# Patient Record
Sex: FEMALE | Race: ASIAN | NOT HISPANIC OR LATINO | Employment: FULL TIME | URBAN - METROPOLITAN AREA
[De-identification: names, ages, dates, MRNs, and addresses within clinical notes are randomized per-mention and may not be internally consistent; named-entity substitution may affect disease eponyms.]

---

## 2019-03-20 ENCOUNTER — OFFICE VISIT (OUTPATIENT)
Dept: FAMILY MEDICINE | Facility: CLINIC | Age: 39
End: 2019-03-20
Payer: COMMERCIAL

## 2019-03-20 VITALS
RESPIRATION RATE: 16 BRPM | HEART RATE: 77 BPM | BODY MASS INDEX: 18.07 KG/M2 | HEIGHT: 63 IN | OXYGEN SATURATION: 99 % | SYSTOLIC BLOOD PRESSURE: 106 MMHG | WEIGHT: 102 LBS | DIASTOLIC BLOOD PRESSURE: 73 MMHG

## 2019-03-20 DIAGNOSIS — N64.4 BREAST PAIN: ICD-10-CM

## 2019-03-20 DIAGNOSIS — R06.00 DYSPNEA, UNSPECIFIED TYPE: ICD-10-CM

## 2019-03-20 DIAGNOSIS — Z00.00 WELL ADULT EXAM: Primary | ICD-10-CM

## 2019-03-20 DIAGNOSIS — R00.2 PALPITATIONS: ICD-10-CM

## 2019-03-20 DIAGNOSIS — Z30.011 ENCOUNTER FOR INITIAL PRESCRIPTION OF CONTRACEPTIVE PILLS: ICD-10-CM

## 2019-03-20 PROCEDURE — G0145 SCR C/V CYTO,THINLAYER,RESCR: HCPCS | Performed by: FAMILY MEDICINE

## 2019-03-20 PROCEDURE — 87624 HPV HI-RISK TYP POOLED RSLT: CPT | Performed by: FAMILY MEDICINE

## 2019-03-20 PROCEDURE — 99213 OFFICE O/P EST LOW 20 MIN: CPT | Mod: 25 | Performed by: FAMILY MEDICINE

## 2019-03-20 PROCEDURE — 99385 PREV VISIT NEW AGE 18-39: CPT | Performed by: FAMILY MEDICINE

## 2019-03-20 RX ORDER — LEVONORGESTREL/ETHIN.ESTRADIOL 0.1-0.02MG
1 TABLET ORAL DAILY
Qty: 84 TABLET | Refills: 3 | Status: SHIPPED | OUTPATIENT
Start: 2019-03-20 | End: 2019-12-10

## 2019-03-20 SDOH — HEALTH STABILITY: MENTAL HEALTH: HOW OFTEN DO YOU HAVE A DRINK CONTAINING ALCOHOL?: NEVER

## 2019-03-20 ASSESSMENT — ENCOUNTER SYMPTOMS
SHORTNESS OF BREATH: 1
DIZZINESS: 1
PALPITATIONS: 1

## 2019-03-20 ASSESSMENT — MIFFLIN-ST. JEOR: SCORE: 1110.76

## 2019-03-20 NOTE — PROGRESS NOTES
"h   SUBJECTIVE:   CC: Lorie Rosales is an 38 year old woman who presents for preventive health visit.     Physical   Annual:     Getting at least 3 servings of Calcium per day:  Yes    Bi-annual eye exam:  NO    Dental care twice a year:  Yes    Sleep apnea or symptoms of sleep apnea:  None and Daytime drowsiness    Diet:  Regular (no restrictions)    Frequency of exercise:  None    Taking medications regularly:  Not Applicable    Additional concerns today:  Yes (shortness of breath, heart skipping a best, left breast pain, headache )    PHQ-2 Total Score: 0    Left breast pain: for the past one week, she has had intermittent brief episodes of left breast pain around the nipple, stating it \"feels like something releases.\"  No nipple discharge, lump or redness.  The pain will happen 5-6 times per day, might be triggered by change in position, lasts about 10 seconds and resolves on its own.  No family h/o breast cancer.  LMP was a week and a half ago.      Shortness of breath--intermittent, has had this issue for a long time and had an evaluation done with [facility] in [place] where she was living, including labs such as CBC and TSH, and a pulmonary function test; she reports all was normal, but she is still having it and it is bothersome.  It is not worse with exertion.  No associated chest pain, dizziness, edema.  She has had very intermittent palpitations, just one skipped beat here and there, not necessarily at the same time as the dyspnea.  The dyspnea might be worse with stress.  She denies acid reflux.  No history of smoking or asthma.  No family history of lung disease or early heart disease or arrhythmia.     Migraines: right posterior head throbbing, happens once or twice per week, takes over the counter pain relievers.  No associated n/v, light or sound sensitivity or aura.      History of BPPV: would get once-yearly episodes of vertigo in 2016 and 2017.  No recent symptoms.  States she was checked for " Meniere's disease, negative work up.      CV: she states her mother had a brain bleed at age 49. She thinks it was an aneurysm.  No other known family members with aneurysm, or early CAD.  She was not advised to have imaging done for screening.  Malignancy: no family h/o cancer.  She is due for a pap.  Bone health: not much exercise  Immunizations: She reports having a tetanus shot 3-4 years ago.  Sexual health: she has two children, ages 5 and 8.  Monogamous with .  Periods are regular.  She had taken OCPs in the past, but as she had not been back to see her doctor, her refills were discontinued.  She would like to restart them.  Denies any s/e in the past.  Again, no aura with migraines.    Depression screen: neg          Today's PHQ-2 Score:   PHQ-2 ( 1999 Pf[employer]) 3/20/2019   Q1: Little interest or pleasure in doing things 0   Q2: Feeling down, depressed or hopeless 0   PHQ-2 Score 0   Q1: Little interest or pleasure in doing things Not at all   Q2: Feeling down, depressed or hopeless Not at all   PHQ-2 Score 0     Abuse: Current or Past(Physical, Sexual or Emotional)- No  Do you feel safe in your environment? Yes    Social History     Tobacco Use     Smoking status: Never Smoker     Smokeless tobacco: Never Used   Substance Use Topics     Alcohol use: No     Frequency: Never     Alcohol Use 3/20/2019   If you drink alcohol do you typically have greater than 3 drinks per day OR greater than 7 drinks per week? Not Applicable   No flowsheet data found.    Reviewed orders with patient.  Reviewed health maintenance and updated orders accordingly - Yes  There is no problem list on file for this patient.    History reviewed. No pertinent surgical history.    Social History     Tobacco Use     Smoking status: Never Smoker     Smokeless tobacco: Never Used   Substance Use Topics     Alcohol use: No     Frequency: Never     Family History   Problem Relation Age of Onset     Aneurysm Mother 49        brain      "    Current Outpatient Medications   Medication Sig Dispense Refill     levonorgestrel-ethinyl estradiol (AVIANE/ALESSE/LESSINA) 0.1-20 MG-MCG tablet Take 1 tablet by mouth daily 84 tablet 3     No Known Allergies    Mammogram Screening: Patient under age 50, mutual decision reflected in health maintenance.      Pertinent mammograms are reviewed under the imaging tab.  History of abnormal Pap smear: NO - age 30-65 PAP every 5 years with negative HPV co-testing recommended     Reviewed and updated as needed this visit by clinical staff  Tobacco  Allergies  Meds  Med Hx  Surg Hx  Fam Hx  Soc Hx        Reviewed and updated as needed this visit by Provider            Review of Systems   Respiratory: Positive for shortness of breath.    Cardiovascular: Positive for palpitations.   Breasts:  Positive for tenderness.   Neurological: Positive for dizziness.     A 10 point ROS is otherwise negative.        OBJECTIVE:   /73 (BP Location: Right arm, Patient Position: Sitting, Cuff Size: Adult Regular)   Pulse 77   Resp 16   Ht 1.607 m (5' 3.25\")   Wt 46.3 kg (102 lb)   LMP 03/07/2019 (Approximate)   SpO2 99%   BMI 17.93 kg/m    Physical Exam  GENERAL: thin, alert and no distress  EYES: Eyes grossly normal to inspection, PERRL and conjunctivae and sclerae normal  HENT: ear canals and TM's normal, nose and mouth without ulcers or lesions  NECK: no adenopathy, no asymmetry, masses, or scars and thyroid normal to palpation  RESP: lungs clear to auscultation - no rales, rhonchi or wheezes  BREAST: normal without dominant masses; there are some firm mobile nontender nodules in the upper outer quadrants to both breasts though perhaps more prominent on the left breast; however the area where she has pain is more in the lower outer quadrant when it occurs, and there was very minimal tenderness there on exam; no nipple discharge and no palpable axillary masses or adenopathy  CV: regular rate and rhythm, normal S1 S2, " no S3 or S4, no murmur, click or rub, no peripheral edema and peripheral pulses strong  ABDOMEN: soft, nontender, no hepatosplenomegaly, no masses and bowel sounds normal   (female): normal female external genitalia, normal urethral meatus, vaginal mucosa pink, moist, well rugated, and normal cervix/adnexa/uterus without masses or discharge  MS: no gross musculoskeletal defects noted, no edema  SKIN: no suspicious lesions or rashes  NEURO: Normal strength and tone, mentation intact and speech normal  PSYCH: mentation appears normal, affect normal/bright, somewhat anxious        ASSESSMENT/PLAN:   1. Well adult exam  CV: queried about mother's aneurysm history; no other family members affected, and the patient was not previously advised to screen herself.  Malignancy: pap/HPV today.    Bone health: low BMI, lack of exercise  Immunizations: up to date per patient  Sexual health; will restart OCP, advised to monitor headaches  - Pap imaged thin layer screen with HPV - recommended age 30 - 65  - HPV High Risk Types DNA Cervical    2. Encounter for initial prescription of contraceptive pills  As above  - levonorgestrel-ethinyl estradiol (AVIANE/ALESSE/LESSINA) 0.1-20 MG-MCG tablet; Take 1 tablet by mouth daily  Dispense: 84 tablet; Refill: 3    3. Dyspnea, unspecified type  Will request records from [place] regarding previous work up.  Will order holter to evaluate for possible arrhythmia.  Based on the history she gave, it seems like anxiety may be a factor.  - Zio Patch Holter 48 Hour Adult Pediatric; Future    4. Palpitations  As above.   - Zio Patch Holter 48 Hour Adult Pediatric; Future    5.  Left breast pain; vs chest wall pain.  I suggested she monitor for another 1-2 weeks, could try heating pad, nsaid if desired.  If persistent, could evaluate further with breast imaging.  Ok for patient to call with update.    Will hold off on starting ocp until after next period so that will not complicate the picture.  "    COUNSELING:  Reviewed preventive health counseling, as reflected in patient instructions    BP Readings from Last 1 Encounters:   03/20/19 106/73     Estimated body mass index is 17.93 kg/m  as calculated from the following:    Height as of this encounter: 1.607 m (5' 3.25\").    Weight as of this encounter: 46.3 kg (102 lb).           reports that  has never smoked. she has never used smokeless tobacco.      Counseling Resources:  ATP IV Guidelines  Pooled Cohorts Equation Calculator  Breast Cancer Risk Calculator  FRAX Risk Assessment  ICSI Preventive Guidelines  Dietary Guidelines for Americans, 2010  USDA's MyPlate  ASA Prophylaxis  Lung CA Screening    Jessica Field MD  [facility]  "

## 2019-03-20 NOTE — LETTER
March 28, 2019    Lorie Rosales  [address] [place] [address]    Dear ,  This letter is regarding your recent Pap smear (cervical cancer screening) and Human Papillomavirus (HPV) test.  We are happy to inform you that your Pap smear result is normal. Cervical cancer is closely linked with certain types of HPV. Your results showed no evidence of high-risk HPV.  Therefore we recommend you return in 5 years for your next pap smear and HPV test.  You will still need to return to the clinic every year for an annual exam and other preventive tests.  If you have additional questions regarding this result, please call our registered nurse, Halley at [phone].  Sincerely,    Jessica Field MD/[facility]

## 2019-03-22 LAB
COPATH REPORT: NORMAL
PAP: NORMAL

## 2019-03-26 LAB
FINAL DIAGNOSIS: NORMAL
HPV HR 12 DNA CVX QL NAA+PROBE: NEGATIVE
HPV16 DNA SPEC QL NAA+PROBE: NEGATIVE
HPV18 DNA SPEC QL NAA+PROBE: NEGATIVE
SPECIMEN DESCRIPTION: NORMAL
SPECIMEN SOURCE CVX/VAG CYTO: NORMAL

## 2019-04-09 ENCOUNTER — OFFICE VISIT (OUTPATIENT)
Dept: FAMILY MEDICINE | Facility: CLINIC | Age: 39
End: 2019-04-09
Payer: COMMERCIAL

## 2019-04-09 VITALS
OXYGEN SATURATION: 98 % | RESPIRATION RATE: 16 BRPM | SYSTOLIC BLOOD PRESSURE: 101 MMHG | BODY MASS INDEX: 17.57 KG/M2 | DIASTOLIC BLOOD PRESSURE: 66 MMHG | TEMPERATURE: 97.5 F | WEIGHT: 100 LBS | HEART RATE: 59 BPM

## 2019-04-09 DIAGNOSIS — N64.4 BREAST PAIN, LEFT: ICD-10-CM

## 2019-04-09 DIAGNOSIS — Z13.1 SCREENING FOR DIABETES MELLITUS: ICD-10-CM

## 2019-04-09 DIAGNOSIS — H93.12 TINNITUS, LEFT: Primary | ICD-10-CM

## 2019-04-09 DIAGNOSIS — Z13.220 LIPID SCREENING: ICD-10-CM

## 2019-04-09 LAB
ANION GAP SERPL CALCULATED.3IONS-SCNC: 8 MMOL/L (ref 3–14)
BUN SERPL-MCNC: 21 MG/DL (ref 7–30)
CALCIUM SERPL-MCNC: 8.8 MG/DL (ref 8.5–10.1)
CHLORIDE SERPL-SCNC: 107 MMOL/L (ref 94–109)
CHOLEST SERPL-MCNC: 160 MG/DL
CO2 SERPL-SCNC: 25 MMOL/L (ref 20–32)
CREAT SERPL-MCNC: 0.67 MG/DL (ref 0.52–1.04)
GFR SERPL CREATININE-BSD FRML MDRD: >90 ML/MIN/{1.73_M2}
GLUCOSE SERPL-MCNC: 90 MG/DL (ref 70–99)
HDLC SERPL-MCNC: 81 MG/DL
LDLC SERPL CALC-MCNC: 66 MG/DL
NONHDLC SERPL-MCNC: 79 MG/DL
POTASSIUM SERPL-SCNC: 4.1 MMOL/L (ref 3.4–5.3)
SODIUM SERPL-SCNC: 140 MMOL/L (ref 133–144)
T4 FREE SERPL-MCNC: 1.02 NG/DL (ref 0.76–1.46)
TRIGL SERPL-MCNC: 64 MG/DL
TSH SERPL DL<=0.005 MIU/L-ACNC: 2.04 MU/L (ref 0.4–4)

## 2019-04-09 PROCEDURE — 99213 OFFICE O/P EST LOW 20 MIN: CPT | Performed by: FAMILY MEDICINE

## 2019-04-09 PROCEDURE — 84443 ASSAY THYROID STIM HORMONE: CPT | Performed by: FAMILY MEDICINE

## 2019-04-09 PROCEDURE — 80061 LIPID PANEL: CPT | Performed by: FAMILY MEDICINE

## 2019-04-09 PROCEDURE — 84439 ASSAY OF FREE THYROXINE: CPT | Performed by: FAMILY MEDICINE

## 2019-04-09 PROCEDURE — 36415 COLL VENOUS BLD VENIPUNCTURE: CPT | Performed by: FAMILY MEDICINE

## 2019-04-09 PROCEDURE — 80048 BASIC METABOLIC PNL TOTAL CA: CPT | Performed by: FAMILY MEDICINE

## 2019-04-09 NOTE — NURSING NOTE
Patient identified using two patient identifiers.  Ear exam showing wax occlusion completed by provider.  Solution: warm water was placed in the left ear(s) via irrigation tool: elephant ear.      Edmundo Orona MA

## 2019-04-09 NOTE — PROGRESS NOTES
SUBJECTIVE:                                                    Lorie Rosales is a 38 year old female who presents to clinic today for the following health issues:      Ear Problem       Duration: x4 days     Description (location/character/radiation): ringing in left ear    Intensity:  mildmild    Accompanying signs and symptoms: causing dizziness, pressure but not painful      History (similar episodes/previous evaluation): None    Therapies tried and outcome: ear drops     Additional: fasting lab      Low-pitched constant ringing in her left ear, interferes with hearing, sometimes she feels dizzy, no URI symptoms, no fever, no drainage from the ear.  She tried removing wax but that did not help.  She states in the past it has been helpful for the doctor to do an ear wash, even if the canal is clear, for her tinnitus.  She states she has had normal hearing tests in the past in [place].  She notes she has been taking ibuprofen regularly for headaches recently.     She has ongoing left breast pain, maybe every other day, feeling like a muscle spasm.           Problem list and histories reviewed & adjusted, as indicated.  Additional history: as documented    There is no problem list on file for this patient.    History reviewed. No pertinent surgical history.    Social History     Tobacco Use     Smoking status: Never Smoker     Smokeless tobacco: Never Used   Substance Use Topics     Alcohol use: No     Frequency: Never     Family History   Problem Relation Age of Onset     Aneurysm Mother 49        brain         Current Outpatient Medications   Medication Sig Dispense Refill     levonorgestrel-ethinyl estradiol (AVIANE/ALESSE/LESSINA) 0.1-20 MG-MCG tablet Take 1 tablet by mouth daily 84 tablet 3     No Known Allergies    ROS:  Constitutional, HEENT, cardiovascular, pulmonary, gi and gu systems are negative, except as otherwise noted.    OBJECTIVE:     /66 (BP Location: Right arm, Patient Position: Sitting, Cuff  Size: Adult Regular)   Pulse 59   Temp 97.5  F (36.4  C) (Oral)   Resp 16   Wt 45.4 kg (100 lb)   SpO2 98%   BMI 17.57 kg/m    Body mass index is 17.57 kg/m .  GENERAL APPEARANCE: healthy, alert and no distress  EYES: Eyes grossly normal to inspection, PERRL and conjunctivae and sclerae normal  HENT: ear canals and TM's normal with a tiny bit of non-occluding cerumen to the left EAC, and nose and mouth without ulcers or lesions  NECK: no adenopathy, no asymmetry, masses, or scars and thyroid normal to palpation  RESP: lungs clear to auscultation - no rales, rhonchi or wheezes  CV: regular rates and rhythm, normal S1 S2, no S3 or S4 and no murmur, click or rub  PSYCH: mentation appears normal and affect normal/bright    Diagnostic Test Results:  none     ASSESSMENT/PLAN:             1. Tinnitus, left  The patient requested ear lavage which has helped in the past, so this was done.  Advised stopping ibuprofen and similar over the counters due to known s/e of tinnitus.  If symptoms persist, advised ENT consultation.  Also checking renal fxn, thyroid fxn.   - OTOLARYNGOLOGY REFERRAL  - Basic metabolic panel  - TSH  - T4, free    2. Lipid screening    - Lipid Profile    3. Screening for diabetes mellitus    - Basic metabolic panel    4. Breast pain, left  Discussed at recent physical, offered referral to breast center, but she declined for now.           Jessica Field MD  [facility]

## 2019-04-22 NOTE — TELEPHONE ENCOUNTER
FUTURE VISIT INFORMATION      FUTURE VISIT INFORMATION:    Date: 4/30/19    Time: 10:30AM (audio)/ 11:30AM (ENT)     Location: [facility]  REFERRAL INFORMATION:    Referring provider:  Jessica Field MD     Referring providers clinic:  [facility]     Reason for visit/diagnosis  Tinnitus, left     RECORDS REQUESTED FROM:       Clinic name Comments Records Status Imaging Status   [facility]  4/9/19 notes with Dr Field EPIC

## 2019-04-23 ENCOUNTER — OFFICE VISIT (OUTPATIENT)
Dept: FAMILY MEDICINE | Facility: CLINIC | Age: 39
End: 2019-04-23
Payer: COMMERCIAL

## 2019-04-23 VITALS
TEMPERATURE: 98.7 F | WEIGHT: 98.8 LBS | OXYGEN SATURATION: 99 % | DIASTOLIC BLOOD PRESSURE: 60 MMHG | HEART RATE: 100 BPM | BODY MASS INDEX: 17.36 KG/M2 | SYSTOLIC BLOOD PRESSURE: 92 MMHG | RESPIRATION RATE: 16 BRPM

## 2019-04-23 DIAGNOSIS — R07.0 THROAT PAIN: Primary | ICD-10-CM

## 2019-04-23 DIAGNOSIS — J02.0 STREPTOCOCCAL SORE THROAT: ICD-10-CM

## 2019-04-23 LAB
DEPRECATED S PYO AG THROAT QL EIA: ABNORMAL
FLUAV+FLUBV AG SPEC QL: NEGATIVE
FLUAV+FLUBV AG SPEC QL: NEGATIVE
SPECIMEN SOURCE: ABNORMAL
SPECIMEN SOURCE: NORMAL

## 2019-04-23 PROCEDURE — 87804 INFLUENZA ASSAY W/OPTIC: CPT | Performed by: NURSE PRACTITIONER

## 2019-04-23 PROCEDURE — 87880 STREP A ASSAY W/OPTIC: CPT | Performed by: NURSE PRACTITIONER

## 2019-04-23 PROCEDURE — 99213 OFFICE O/P EST LOW 20 MIN: CPT | Performed by: NURSE PRACTITIONER

## 2019-04-23 RX ORDER — PENICILLIN V POTASSIUM 500 MG/1
500 TABLET, FILM COATED ORAL 2 TIMES DAILY
Qty: 20 TABLET | Refills: 0 | Status: SHIPPED | OUTPATIENT
Start: 2019-04-23 | End: 2020-08-05

## 2019-04-23 NOTE — PROGRESS NOTES
SUBJECTIVE:   Lorie Rosales is a 38 year old female who presents to clinic today for the following   health issues:        RESPIRATORY SYMPTOMS      Duration: 2 days     Description  nasal congestion, sore throat, fever, chills and fatigue/malaise    Severity: moderate    Accompanying signs and symptoms: None    History (predisposing factors):  none    Precipitating or alleviating factors: None    Therapies tried and outcome:  rest and fluids        Additional history: as documented    Reviewed  and updated as needed this visit by clinical staff         Reviewed and updated as needed this visit by Provider             ROS:  Review of systems negative except as stated above.    OBJECTIVE:   BP 92/60   Pulse 100   Temp 98.7  F (37.1  C) (Oral)   Resp 16   Wt 44.8 kg (98 lb 12.8 oz)   SpO2 99%   BMI 17.36 kg/m    GENERAL APPEARANCE: healthy, alert and no distress  EYES: EOMI,  PERRL, conjunctiva clear  HENT: ear canals and TM's normal.  Nose normal.  Pharynx erythematous with some exudate noted.  NECK: supple, non-tender to palpation, no adenopathy noted  RESP: lungs clear to auscultation - no rales, rhonchi or wheezes  CV: regular rates and rhythm, normal S1 S2, no murmur noted  ABDOMEN:  soft, nontender, no HSM or masses and bowel sounds normal  SKIN: no suspicious lesions or rashes    Rapid Strep test is positive    ASSESSMENT:      Throat pain  Streptococcal sore throat    PLAN:   See orders in epic.   Penicillin BID x 10 days  Symptomatic treat with gargles, lozenges, and OTC analgesic as needed. Follow-up with primary clinic if not improving.  Advisement given that patient will be contagious for the next 24-48 hours after antibiotics initiated  push fluids, rest as able.  Elevate HOB, lots of handwashing.  Toss your toothbrush after 24 hours on the antibiotics.

## 2019-04-30 ENCOUNTER — PRE VISIT (OUTPATIENT)
Dept: OTOLARYNGOLOGY | Facility: CLINIC | Age: 39
End: 2019-04-30

## 2019-11-13 NOTE — TELEPHONE ENCOUNTER
DIAGNOSIS: Scoliosis per pt. Self referred. Pt had surgery in 1998 at [facility]. Records with [facility] and  [facility]. Pt's records would be under her previous name Bette Villeda   APPOINTMENT DATE: 12/03/2019   NOTES STATUS DETAILS   OFFICE NOTE from referring provider In process [facility] EMAIL TO PATIENT   OFFICE NOTE from other specialist In process    DISCHARGE SUMMARY from hospital In process    DISCHARGE REPORT from the ER N/A    OPERATIVE REPORT In process    MEDICATION LIST In process    IMPLANT RECORD/STICKER N/A    LABS     CBC/DIFF In process    CULTURES N/A    INJECTIONS DONE IN RADIOLOGY In process    MRI In process    CT SCAN In process    XRAYS (IMAGES & REPORTS) In process    TUMOR     PATHOLOGY  Slides & report N/A      Action    Action Taken [facility] EMAIL TO PATIENT 11/13/2019@ 10:22 AM [facility]     11/30/19   10:44 AM   Called patient who states she never got [facility], emailed 2nd  Goldie Adams CMA  12/02/2019  1304  Just received pt [facility] it's a possibility pt will have to be reschedule.  CK

## 2019-12-03 ENCOUNTER — ANCILLARY PROCEDURE (OUTPATIENT)
Dept: GENERAL RADIOLOGY | Facility: CLINIC | Age: 39
End: 2019-12-03
Attending: PREVENTIVE MEDICINE
Payer: COMMERCIAL

## 2019-12-03 ENCOUNTER — PRE VISIT (OUTPATIENT)
Dept: ORTHOPEDICS | Facility: CLINIC | Age: 39
End: 2019-12-03

## 2019-12-03 ENCOUNTER — OFFICE VISIT (OUTPATIENT)
Dept: ORTHOPEDICS | Facility: CLINIC | Age: 39
End: 2019-12-03
Payer: COMMERCIAL

## 2019-12-03 DIAGNOSIS — M41.9 SCOLIOSIS, UNSPECIFIED SCOLIOSIS TYPE, UNSPECIFIED SPINAL REGION: Primary | ICD-10-CM

## 2019-12-03 DIAGNOSIS — M41.9 SCOLIOSIS: ICD-10-CM

## 2019-12-03 NOTE — NURSING NOTE
Reason For Visit:   Chief Complaint   Patient presents with     Consult     Scoliosis        There were no vitals taken for this visit.    Pain Assessment  Patient Currently in Pain: Yes  0-10 Pain Scale: 2  Primary Pain Location: Back(lumbar region)  Other Pain Locations: pt also reported Headaches & breathing problems, wondering if it's related to her back  Aggravating Factors: (exercise)    Blanka Obando ATC

## 2019-12-03 NOTE — LETTER
12/3/2019       RE: Lorie Rosales  [address] [place] [address]     Dear Colleague,    Thank you for referring your patient, Lorie Rosales, to the [facility] at [facility]. Please see a copy of my visit note below.    HISTORY OF PRESENT ILLNESS  Ms. Rosales is a pleasant 39 year old year old female who presents to clinic today with scoliosis  Had scoliosis surgery at age 18    Lorie explains that she was ok for a while and began having some sense that her curve is going   Location: lower back pain  Quality:  achy pain    Severity: 5/10 at worst    Duration: over the past year  Timing: occurs intermittently  Context: occurs while exercising and lifting  Modifying factors:  resting and non-use makes it better, movement and use makes it worse  Associated signs & symptoms: no radiation into legs  Additional history: as documented    MEDICAL HISTORY  There is no problem list on file for this patient.      Current Outpatient Medications   Medication Sig Dispense Refill     levonorgestrel-ethinyl estradiol (AVIANE/ALESSE/LESSINA) 0.1-20 MG-MCG tablet Take 1 tablet by mouth daily 84 tablet 3     penicillin V (VEETID) 500 MG tablet Take 1 tablet (500 mg) by mouth 2 times daily 20 tablet 0       No Known Allergies    Family History   Problem Relation Age of Onset     Aneurysm Mother 49        brain       Additional medical/Social/Surgical histories reviewed in [facility] and updated as appropriate.     REVIEW OF SYSTEMS (12/3/2019)  10 point ROS of systems including Constitutional, Eyes, Respiratory, Cardiovascular, Gastroenterology, Genitourinary, Integumentary, Musculoskeletal, Psychiatric were all negative except for pertinent positives noted in my HPI.     PHYSICAL EXAM  VSS    General  - normal appearance, in no obvious distress  CV  - normal peripheral perfusion  Pulm  - normal respiratory pattern, non-labored  Musculoskeletal - lumbar spine  - stance: normal gait  without limp, no obvious leg length discrepancy, normal heel and toe walk  - inspection: abnormal bone and joint alignment, has obvious scoliosis  - palpation: no paravertebral or bony tenderness  - ROM: flexion exacerbates some mild pain, normal extension, sidebending, rotation  - strength: lower extremities 5/5 in all planes  - special tests:  (-) straight leg raise  (+) slump test- some pain  Neuro  - patellar and Achilles DTRs 2+ bilaterally, NO lower extremity sensory deficit throughout L5 distribution, grossly normal coordination, normal muscle tone  Skin  - no ecchymosis, erythema, warmth, or induration, no obvious rash  Psych  - interactive, appropriate, normal mood and affect  ASSESSMENT & PLAN  40 yo female with scoliosis, lumbar spasms  Reviewed lumbar xrays: shows intact instrumentation and has scoliosis curve  Consider CT   Start PT  F/u after    Again, thank you for allowing me to participate in the care of your patient.      Sincerely,    Josh Case MD

## 2019-12-03 NOTE — PROGRESS NOTES
HISTORY OF PRESENT ILLNESS  Ms. Rosales is a pleasant 39 year old year old female who presents to clinic today with scoliosis  Had scoliosis surgery at age 18    Lorie explains that she was ok for a while and began having some sense that her curve is going   Location: lower back pain  Quality:  achy pain    Severity: 5/10 at worst    Duration: over the past year  Timing: occurs intermittently  Context: occurs while exercising and lifting  Modifying factors:  resting and non-use makes it better, movement and use makes it worse  Associated signs & symptoms: no radiation into legs  Additional history: as documented    MEDICAL HISTORY  There is no problem list on file for this patient.      Current Outpatient Medications   Medication Sig Dispense Refill     levonorgestrel-ethinyl estradiol (AVIANE/ALESSE/LESSINA) 0.1-20 MG-MCG tablet Take 1 tablet by mouth daily 84 tablet 3     penicillin V (VEETID) 500 MG tablet Take 1 tablet (500 mg) by mouth 2 times daily 20 tablet 0       No Known Allergies    Family History   Problem Relation Age of Onset     Aneurysm Mother 49        brain       Additional medical/Social/Surgical histories reviewed in [facility] and updated as appropriate.     REVIEW OF SYSTEMS (12/3/2019)  10 point ROS of systems including Constitutional, Eyes, Respiratory, Cardiovascular, Gastroenterology, Genitourinary, Integumentary, Musculoskeletal, Psychiatric were all negative except for pertinent positives noted in my HPI.     PHYSICAL EXAM  VSS    General  - normal appearance, in no obvious distress  CV  - normal peripheral perfusion  Pulm  - normal respiratory pattern, non-labored  Musculoskeletal - lumbar spine  - stance: normal gait without limp, no obvious leg length discrepancy, normal heel and toe walk  - inspection: abnormal bone and joint alignment, has obvious scoliosis  - palpation: no paravertebral or bony tenderness  - ROM: flexion exacerbates some mild pain, normal extension, sidebending,  rotation  - strength: lower extremities 5/5 in all planes  - special tests:  (-) straight leg raise  (+) slump test- some pain  Neuro  - patellar and Achilles DTRs 2+ bilaterally, NO lower extremity sensory deficit throughout L5 distribution, grossly normal coordination, normal muscle tone  Skin  - no ecchymosis, erythema, warmth, or induration, no obvious rash  Psych  - interactive, appropriate, normal mood and affect  ASSESSMENT & PLAN  38 yo female with scoliosis, lumbar spasms  Reviewed lumbar xrays: shows intact instrumentation and has scoliosis curve  Consider CT   Start PT  F/u after    Josh Case MD, CAQSM

## 2019-12-10 ENCOUNTER — MYC REFILL (OUTPATIENT)
Dept: FAMILY MEDICINE | Facility: CLINIC | Age: 39
End: 2019-12-10

## 2019-12-10 DIAGNOSIS — Z30.011 ENCOUNTER FOR INITIAL PRESCRIPTION OF CONTRACEPTIVE PILLS: ICD-10-CM

## 2019-12-11 RX ORDER — LEVONORGESTREL/ETHIN.ESTRADIOL 0.1-0.02MG
1 TABLET ORAL DAILY
Qty: 84 TABLET | Refills: 0 | Status: SHIPPED | OUTPATIENT
Start: 2019-12-11 | End: 2020-03-24

## 2019-12-12 ENCOUNTER — THERAPY VISIT (OUTPATIENT)
Dept: PHYSICAL THERAPY | Facility: CLINIC | Age: 39
End: 2019-12-12
Attending: PREVENTIVE MEDICINE
Payer: COMMERCIAL

## 2019-12-12 DIAGNOSIS — M54.50 LEFT-SIDED LOW BACK PAIN WITHOUT SCIATICA: ICD-10-CM

## 2019-12-12 DIAGNOSIS — M41.9 SCOLIOSIS: ICD-10-CM

## 2019-12-12 PROCEDURE — 97161 PT EVAL LOW COMPLEX 20 MIN: CPT | Mod: GP | Performed by: PHYSICAL THERAPIST

## 2019-12-12 PROCEDURE — 97110 THERAPEUTIC EXERCISES: CPT | Mod: GP | Performed by: PHYSICAL THERAPIST

## 2019-12-12 PROCEDURE — 97112 NEUROMUSCULAR REEDUCATION: CPT | Mod: GP | Performed by: PHYSICAL THERAPIST

## 2019-12-12 NOTE — PROGRESS NOTES
[facility] Initial Evaluation  Subjective:  Pt presents to PT with a chief complaint of left sided LBP associated with scoliosis with reported worsening in 08/2019 after running an 8 K. Pt reports having a previous lateral spinal fusion T12-L3 in 1999. Pain reported to be worse with standing, walking, and transitional movements.     Lorie Rosales being seen for backpain.   Date of Onset: 08/2019. Where condition occurred: other.Problem occurred: scoliosis  and reported as 3/10 on pain scale. General health as reported by patient is fair. Pertinent medical history includes:  Asthma, concussions/dizziness and migraines/headaches.    Surgeries include:  Orthopedic surgery.  Current medications:  Other. Other medications details: birth control.   Primary job tasks include:  Computer work.  Pain is described as aching and burning and is intermittent.  Since onset symptoms are unchanged. Special tests:  X-ray. Previous treatment includes surgery. There was significant improvement following previous treatment.   Patient is [employer] . Restrictions include:  Working in normal job without restrictions.    Barriers include:  None as reported by patient.  Red flags:  Other.  Type of problem:  Lumbar   Condition occurred with:  Repetition/overuse. This is a new condition   Problem details: 08/2019.   Patient reports pain:  Lower lumbar spine and lumbar spine left.  Associated symptoms:  Loss of motion/stiffness. Symptoms are exacerbated by twisting, standing, lifting and walking and relieved by heat and ice.                      Objective:  Standing Alignment:        Lumbar deviations alignment: L lumbar curve, R thoracic curve.                           Lumbar/SI Evaluation  ROM:    AROM Lumbar:   Flexion:          Mod loss, pain +  Ext:                    Min loss   Side Bend:        Left:     Right:   Rotation:           Left:  Mod loss, pain +    Right:  Min loss  Side Glide:        Left:      Right:           Lumbar Myotomes:  normal                    Lumbar Palpation:  Palpation (lumbar): significant tensions at L lumbar paraspinals.                                              Hip Evaluation    Hip Strength:    Flexion:   Left: 4+/5   +  Pain:  Right: 5/5   Pain:                    Extension:  Left: 4+/5  Pain:Right: 4+/5    Pain:    Abduction:  Left: 4-/5     Pain:Right: 4/5    Pain:                    Hip Palpation:    Left hip tenderness present at:   Piriformis and Abductors               General     ROS    Assessment/Plan:    Patient is a 39 year old female with lumbar complaints.    Patient has the following significant findings with corresponding treatment plan.                Diagnosis 1:  Left sided LBP  Pain -  manual therapy and splint/taping/bracing/orthotics  Decreased ROM/flexibility - manual therapy and therapeutic exercise  Decreased strength - therapeutic exercise and therapeutic activities  Impaired muscle performance - neuro re-education    Therapy Evaluation Codes:   Cumulative Therapy Evaluation is: Low complexity.    Previous and current functional limitations:  (See Goal Flow Sheet for this information)    Short term and Long term goals: (See Goal Flow Sheet for this information)     Communication ability:  Patient appears to be able to clearly communicate and understand verbal and written communication and follow directions correctly.  Treatment Explanation - The following has been discussed with the patient:   RX ordered/plan of care  Anticipated outcomes  Possible risks and side effects  This patient would benefit from PT intervention to resume normal activities.   Rehab potential is good.    Frequency:  1 X week, once daily  Duration:  for 4 weeks tapering to 2 X a month over 4 weeks  Discharge Plan:  Achieve all LTG.  Independent in home treatment program.  Reach maximal therapeutic benefit.    Please refer to the daily flowsheet for treatment today, total treatment time  and time spent performing 1:1 timed codes.

## 2019-12-12 NOTE — TELEPHONE ENCOUNTER
"Requested Prescriptions   Pending Prescriptions Disp Refills     levonorgestrel-ethinyl estradiol (AVIANE/ALESSE/LESSINA) 0.1-20 MG-MCG tablet 84 tablet 3     Sig: Take 1 tablet by mouth daily       Contraceptives Protocol Passed - 12/10/2019 11:41 PM        Passed - Patient is not a current smoker if age is 35 or older        Passed - Recent (12 mo) or future (30 days) visit within the authorizing provider's specialty     Patient has had an office visit with the authorizing provider or a provider within the authorizing providers department within the previous 12 mos or has a future within next 30 days. See \"Patient Info\" tab in inbasket, or \"Choose Columns\" in Meds & Orders section of the refill encounter.              Passed - Medication is active on med list        Passed - No active pregnancy on record        Passed - No positive pregnancy test in past 12 months        Signed Prescriptions:                        Disp   Refills    levonorgestrel-ethinyl estradiol (AVIANE/A*84 tab*0        Sig: Take 1 tablet by mouth daily  Authorizing Provider: JENNY ALEJANDRO  Ordering User: WALLACE PALMER      "

## 2019-12-13 PROBLEM — M54.50 LEFT-SIDED LOW BACK PAIN WITHOUT SCIATICA: Status: ACTIVE | Noted: 2019-12-13

## 2020-03-24 DIAGNOSIS — Z30.011 ENCOUNTER FOR INITIAL PRESCRIPTION OF CONTRACEPTIVE PILLS: ICD-10-CM

## 2020-03-24 PROBLEM — M54.50 LEFT-SIDED LOW BACK PAIN WITHOUT SCIATICA: Status: RESOLVED | Noted: 2019-12-13 | Resolved: 2020-03-24

## 2020-03-24 RX ORDER — LEVONORGESTREL AND ETHINYL ESTRADIOL 0.1-0.02MG
KIT ORAL
Qty: 84 TABLET | Refills: 3 | Status: SHIPPED | OUTPATIENT
Start: 2020-03-24 | End: 2021-04-05

## 2020-03-24 NOTE — TELEPHONE ENCOUNTER
"Requested Prescriptions   Signed Prescriptions Disp Refills    FALMINA 0.1-20 MG-MCG tablet 84 tablet 3     Sig: TAKE ONE TABLET BY MOUTH ONCE DAILY       Contraceptives Protocol Passed - 3/24/2020 12:40 PM        Passed - Patient is not a current smoker if age is 35 or older        Passed - Recent (12 mo) or future (30 days) visit within the authorizing provider's specialty     Patient has had an office visit with the authorizing provider or a provider within the authorizing providers department within the previous 12 mos or has a future within next 30 days. See \"Patient Info\" tab in inbasket, or \"Choose Columns\" in Meds & Orders section of the refill encounter.              Passed - Medication is active on med list        Passed - No active pregnancy on record        Passed - No positive pregnancy test in past 12 months           Signed 3/24/2020  Signed Prescriptions:                        Disp   Refills    FALMINA 0.1-20 MG-MCG tablet               84 tab*3        Sig: TAKE ONE TABLET BY MOUTH ONCE DAILY  Authorizing Provider: MINO VICTORIA      "

## 2020-07-23 ENCOUNTER — OFFICE VISIT (OUTPATIENT)
Dept: FAMILY MEDICINE | Facility: CLINIC | Age: 40
End: 2020-07-23
Payer: COMMERCIAL

## 2020-07-23 VITALS
WEIGHT: 107 LBS | HEIGHT: 63 IN | DIASTOLIC BLOOD PRESSURE: 84 MMHG | TEMPERATURE: 98 F | OXYGEN SATURATION: 97 % | RESPIRATION RATE: 16 BRPM | BODY MASS INDEX: 18.96 KG/M2 | HEART RATE: 68 BPM | SYSTOLIC BLOOD PRESSURE: 121 MMHG

## 2020-07-23 DIAGNOSIS — R53.83 OTHER FATIGUE: ICD-10-CM

## 2020-07-23 DIAGNOSIS — Z00.00 ROUTINE GENERAL MEDICAL EXAMINATION AT A HEALTH CARE FACILITY: Primary | ICD-10-CM

## 2020-07-23 DIAGNOSIS — R51.9 NONINTRACTABLE HEADACHE, UNSPECIFIED CHRONICITY PATTERN, UNSPECIFIED HEADACHE TYPE: ICD-10-CM

## 2020-07-23 LAB
ALBUMIN SERPL-MCNC: 4.1 G/DL (ref 3.4–5)
ALP SERPL-CCNC: 65 U/L (ref 40–150)
ALT SERPL W P-5'-P-CCNC: 23 U/L (ref 0–50)
ANION GAP SERPL CALCULATED.3IONS-SCNC: 7 MMOL/L (ref 3–14)
AST SERPL W P-5'-P-CCNC: 15 U/L (ref 0–45)
BASOPHILS # BLD AUTO: 0 10E9/L (ref 0–0.2)
BASOPHILS NFR BLD AUTO: 0.2 %
BILIRUB SERPL-MCNC: 0.4 MG/DL (ref 0.2–1.3)
BUN SERPL-MCNC: 10 MG/DL (ref 7–30)
CALCIUM SERPL-MCNC: 8.7 MG/DL (ref 8.5–10.1)
CHLORIDE SERPL-SCNC: 106 MMOL/L (ref 94–109)
CHOLEST SERPL-MCNC: 165 MG/DL
CO2 SERPL-SCNC: 26 MMOL/L (ref 20–32)
CREAT SERPL-MCNC: 0.62 MG/DL (ref 0.52–1.04)
DIFFERENTIAL METHOD BLD: NORMAL
EOSINOPHIL # BLD AUTO: 0.2 10E9/L (ref 0–0.7)
EOSINOPHIL NFR BLD AUTO: 4.1 %
ERYTHROCYTE [DISTWIDTH] IN BLOOD BY AUTOMATED COUNT: 11.5 % (ref 10–15)
GFR SERPL CREATININE-BSD FRML MDRD: >90 ML/MIN/{1.73_M2}
GLUCOSE SERPL-MCNC: 95 MG/DL (ref 70–99)
HCT VFR BLD AUTO: 41.9 % (ref 35–47)
HDLC SERPL-MCNC: 58 MG/DL
HGB BLD-MCNC: 13.8 G/DL (ref 11.7–15.7)
LDLC SERPL CALC-MCNC: 75 MG/DL
LYMPHOCYTES # BLD AUTO: 2.8 10E9/L (ref 0.8–5.3)
LYMPHOCYTES NFR BLD AUTO: 47.2 %
MCH RBC QN AUTO: 31.2 PG (ref 26.5–33)
MCHC RBC AUTO-ENTMCNC: 32.9 G/DL (ref 31.5–36.5)
MCV RBC AUTO: 95 FL (ref 78–100)
MONOCYTES # BLD AUTO: 0.4 10E9/L (ref 0–1.3)
MONOCYTES NFR BLD AUTO: 6.7 %
NEUTROPHILS # BLD AUTO: 2.4 10E9/L (ref 1.6–8.3)
NEUTROPHILS NFR BLD AUTO: 41.8 %
NONHDLC SERPL-MCNC: 107 MG/DL
PLATELET # BLD AUTO: 290 10E9/L (ref 150–450)
POTASSIUM SERPL-SCNC: 3.9 MMOL/L (ref 3.4–5.3)
PROT SERPL-MCNC: 7.5 G/DL (ref 6.8–8.8)
RBC # BLD AUTO: 4.43 10E12/L (ref 3.8–5.2)
SODIUM SERPL-SCNC: 139 MMOL/L (ref 133–144)
TRIGL SERPL-MCNC: 160 MG/DL
TSH SERPL DL<=0.005 MIU/L-ACNC: 0.92 MU/L (ref 0.4–4)
WBC # BLD AUTO: 5.8 10E9/L (ref 4–11)

## 2020-07-23 PROCEDURE — 99396 PREV VISIT EST AGE 40-64: CPT | Performed by: FAMILY MEDICINE

## 2020-07-23 PROCEDURE — 99213 OFFICE O/P EST LOW 20 MIN: CPT | Mod: 25 | Performed by: FAMILY MEDICINE

## 2020-07-23 PROCEDURE — 80050 GENERAL HEALTH PANEL: CPT | Performed by: FAMILY MEDICINE

## 2020-07-23 PROCEDURE — 36415 COLL VENOUS BLD VENIPUNCTURE: CPT | Performed by: FAMILY MEDICINE

## 2020-07-23 PROCEDURE — 80061 LIPID PANEL: CPT | Performed by: FAMILY MEDICINE

## 2020-07-23 ASSESSMENT — MIFFLIN-ST. JEOR: SCORE: 1128.44

## 2020-07-23 NOTE — PROGRESS NOTES
SUBJECTIVE:   CC: Lorie Rosales is an 39 year old woman who presents for preventive health visit.     Healthy Habits:    Do you get at least three servings of calcium containing foods daily (dairy, green leafy vegetables, etc.)? yes    Amount of exercise or daily activities, outside of work: 7 day(s) per week    Problems taking medications regularly No    Medication side effects: No    Have you had an eye exam in the past two years? no    Do you see a dentist twice per year? 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  Lives at home with two  kids 7 and 10.    HCM  PAP 2019.  Menses still regular.    Additional Concerns  Notes increased weight in past 2 years since move from [place].  Never was able to gain weight.  Now feels better up a few pounds.   HAs- never had full assessment.  Takes daily Tylenol and ibuprofen a few times a day with still no long-term relief.  She would like to see NEURO for HA consult.  Fatigue.  Would like labs today to check for possible causes to her increased fatigue recently.  Unsure if it is just stress or something else.    Today's PHQ-2 Score:   PHQ-2 ( 1999 Pfizer) 7/23/2020 3/20/2019   Q1: Little interest or pleasure in doing things 0 0   Q2: Feeling down, depressed or hopeless 0 0   PHQ-2 Score 0 0   Q1: Little interest or pleasure in doing things - Not at all   Q2: Feeling down, depressed or hopeless - Not at all   PHQ-2 Score - 0     Abuse: Current or Past(Physical, Sexual or Emotional)- No  Do you feel safe in your environment? Yes    Social History     Tobacco Use     Smoking status: Never Smoker     Smokeless tobacco: Never Used   Substance Use Topics     Alcohol use: No     Frequency: Never     If you drink alcohol do you  typically have >3 drinks per day or >7 drinks per week? No                     Reviewed orders with patient.  Reviewed health maintenance and updated orders accordingly - Yes  BP Readings from Last 3 Encounters:   07/23/20 121/84   04/23/19 92/60   04/09/19 101/66    Wt Readings from Last 3 Encounters:   07/23/20 48.5 kg (107 lb)   04/23/19 44.8 kg (98 lb 12.8 oz)   04/09/19 45.4 kg (100 lb)                  Patient Active Problem List   Diagnosis   (none) - all problems resolved or deleted     History reviewed. No pertinent surgical history.    Social History     Tobacco Use     Smoking status: Never Smoker     Smokeless tobacco: Never Used   Substance Use Topics     Alcohol use: No     Frequency: Never     Family History   Problem Relation Age of Onset     Aneurysm Mother 49        brain         Current Outpatient Medications   Medication Sig Dispense Refill     FALMINA 0.1-20 MG-MCG tablet TAKE ONE TABLET BY MOUTH ONCE DAILY 84 tablet 3     penicillin V (VEETID) 500 MG tablet Take 1 tablet (500 mg) by mouth 2 times daily 20 tablet 0     No Known Allergies  Recent Labs   Lab Test 04/09/19  0753   LDL 66   HDL 81   TRIG 64   CR 0.67   GFRESTIMATED >90   GFRESTBLACK >90   POTASSIUM 4.1   TSH 2.04        Mammogram Screening: Patient under age 50, mutual decision reflected in health maintenance.      Pertinent mammograms are reviewed under the imaging tab.  History of abnormal Pap smear: NO - age 30-65 PAP every 5 years with negative HPV co-testing recommended  PAP / HPV Latest Ref Rng & Units 3/20/2019   PAP - NIL   HPV 16 DNA NEG:Negative Negative   HPV 18 DNA NEG:Negative Negative   OTHER HR HPV NEG:Negative Negative     Reviewed and updated as needed this visit by clinical staff         Reviewed and updated as needed this visit by Provider            ROS:  CONSTITUTIONAL: NEGATIVE for fever, chills, change in weight  INTEGUMENTARU/SKIN: NEGATIVE for worrisome rashes, moles or lesions  EYES: NEGATIVE for vision  "changes or irritation  ENT: NEGATIVE for ear, mouth and throat problems  RESP: NEGATIVE for significant cough or SOB  BREAST: NEGATIVE for masses, tenderness or discharge  CV: NEGATIVE for chest pain, palpitations or peripheral edema  GI: NEGATIVE for nausea, abdominal pain, heartburn, or change in bowel habits  : NEGATIVE for unusual urinary or vaginal symptoms. Periods are regular.  MUSCULOSKELETAL: NEGATIVE for significant arthralgias or myalgia  NEURO: NEGATIVE for weakness, dizziness or paresthesias  PSYCHIATRIC: NEGATIVE for changes in mood or affect    OBJECTIVE:     /84 (BP Location: Left arm, Patient Position: Sitting, Cuff Size: Adult Regular)   Pulse 68   Temp 98  F (36.7  C) (Oral)   Resp 16   Ht 1.607 m (5' 3.25\")   Wt 48.5 kg (107 lb)   SpO2 97%   BMI 18.80 kg/m      EXAM:  GENERAL: healthy, alert and no distress  EYES: Eyes grossly normal to inspection, PERRL and conjunctivae and sclerae normal  HENT: ear canals and TM's normal, nose and mouth without ulcers or lesions  NECK: no adenopathy, no asymmetry, masses, or scars and thyroid normal to palpation  RESP: lungs clear to auscultation - no rales, rhonchi or wheezes  CV: regular rate and rhythm, normal S1 S2, no S3 or S4, no murmur, click or rub, no peripheral edema and peripheral pulses strong  ABDOMEN: soft, nontender, no hepatosplenomegaly, no masses and bowel sounds normal  MS: no gross musculoskeletal defects noted, no edema  SKIN: no suspicious lesions or rashes  NEURO: Normal strength and tone, mentation intact and speech normal  PSYCH: mentation appears normal, affect normal/bright    ASSESSMENT/PLAN:   1. Routine general medical examination at a health care facility    - CBC with platelets differential  - Comprehensive metabolic panel  - Lipid panel reflex to direct LDL Fasting  - TSH with free T4 reflex    Fasting labs today.  Continue good diet and exercise.  Annual mammograms starting at ago 39 discussed- patient to call to " "schedule.    2. Nonintractable headache, unspecified chronicity pattern, unspecified headache type    - NEUROLOGY ADULT REFERRAL    Neuro referral today- discussed rebound HAs from excess OTC medication use.  Further plan per NEURO.    3. Other fatigue    - CBC with platelets differential  - TSH with free T4 reflex    Labs today for review.    COUNSELING:   Reviewed preventive health counseling, as reflected in patient instructions       Regular exercise       Healthy diet/nutrition    Estimated body mass index is 17.36 kg/m  as calculated from the following:    Height as of 3/20/19: 1.607 m (5' 3.25\").    Weight as of 4/23/19: 44.8 kg (98 lb 12.8 oz).         reports that she has never smoked. She has never used smokeless tobacco.      Counseling Resources:  ATP IV Guidelines  Pooled Cohorts Equation Calculator  Breast Cancer Risk Calculator  FRAX Risk Assessment  ICSI Preventive Guidelines  Dietary Guidelines for Americans, 2010  USDA's MyPlate  ASA Prophylaxis  Lung CA Screening    Geetha June MD  [facility]  "

## 2020-07-30 ENCOUNTER — ANCILLARY PROCEDURE (OUTPATIENT)
Dept: MAMMOGRAPHY | Facility: CLINIC | Age: 40
End: 2020-07-30
Attending: FAMILY MEDICINE
Payer: COMMERCIAL

## 2020-07-30 DIAGNOSIS — Z12.31 VISIT FOR SCREENING MAMMOGRAM: ICD-10-CM

## 2020-08-04 NOTE — PROGRESS NOTES
INITIAL NEUROLOGY CONSULTATION    DATE OF VISIT: 8/5/2020  CLINIC LOCATION: [facility]  MRN: [id number]  PATIENT NAME: Lorie Rosales  YOB: 1980    PRIMARY CARE PROVIDER: Geetha June MD     REASON FOR VISIT:   Chief Complaint   Patient presents with     Headache     HISTORY OF PRESENT ILLNESS:                                                    Ms. Lorie Rosales is 39 year old right handed female patient without significant past medical history, who was seen in consultation today requested by Geetha June MD, for headache.    Per patient's report, she has history of persistent headaches.  She treats it with daily Tylenol and/or ibuprofen without significant relief.  Never had full evaluation for headaches.  Also had severe vertigo episodes in 2014 and in 2016.  In 2016 felt to be triggered by possible migraines.  She was recently seen by primary care provider, who referred her to neurology.    At the present time, intermittent throbbing/burning 4-7/10 headache occurs randomly during the day or night lasting for several hours.  It is located on the top of the head or could be holocephalic.  She might have daily headaches for 2 to 3 weeks followed by 1 week of headache-free period.  Associated symptoms include neck and shoulder discomfort.  Focused activities and working on the computer make them worse.  Using analgesics makes them better.  Takes Tylenol and/or ibuprofen on more than 15 days/month.  No other treatments tried.      The patient reports adequate restful sleep.  She eats 3 meals per day and drinks approximately 24 ounces of fluids and 2 to 3 cups of coffee without additional caffeinated beverages.  She rates her stress level as moderate.  Reports 1 remote head injury during childhood without sequelae.  No prior history of seizures or brain infections.  Denies any focal neurological symptoms.    Recent laboratory evaluation from July 2020 includes  unremarkable BMP, normal TSH (0.92), and normal CBC.    No prior brain imaging.  No additional useful information is available in Care Everywhere, which was reviewed.    Review of Systems - the patient endorses history of fever, fatigue, recent weight gain, chest pain, heart murmur, irregular heartbeat, memory problems, tinnitus, and shortness of breath.  All of them have been previously discussed with other medical providers. Otherwise, she denies any other complaints on 14-point comprehensive review of systems.  PAST MEDICAL/SURGICAL HISTORY:                                                    I personally reviewed patient's past medical and surgical history with the patient at today's visit.  History reviewed. No pertinent past medical history.  History reviewed. No pertinent surgical history.  MEDICATIONS:                                                    I personally reviewed patient's medications and allergies with the patient at today's visit.  FALMINA 0.1-20 MG-MCG tablet, TAKE ONE TABLET BY MOUTH ONCE DAILY  penicillin V (VEETID) 500 MG tablet, Take 1 tablet (500 mg) by mouth 2 times daily    No current facility-administered medications on file prior to visit.     ALLERGIES:                                                    No Known Allergies  FAMILY/SOCIAL HISTORY:                                                    Family and social history was reviewed with the patient at today's visit.  Family history is positive for brain hemorrhage (mother, no brain aneurysm was found during her evaluation), headache, dementia/Alzheimer's disease.  , lives with family.  Works full-time for the [employer].  Never smoker.  Denies current alcohol and recreational drug use.  REVIEW OF SYSTEMS:                                                    Patient has completed a [facility] Patient Health History, including a 14-system review, which was personally reviewed, and pertinent positives are listed in HPI. She  denies any additional problems on the further questioning.  EXAM:                                                    VITAL SIGNS:   BP 98/60   Pulse 81   Temp 98.1  F (36.7  C) (Oral)   Wt 48.3 kg (106 lb 6.4 oz)   SpO2 99%   BMI 18.70 kg/m    Mini-Cog Assessment:  Mini Cog Assessment  Clock Draw Score: 2 Normal  3 Item Recall: 3 objects recalled  Mini Cog Total Score: 5    General: pt is in NAD, cooperative.  Skin: normal turgor, moist mucous membranes, no lesions/rashes noticed.  HEENT: ATNC, EOMI, PERRL, white sclera, normal conjunctiva, no nystagmus or ptosis. No carotid bruits bilaterally.  Respiratory: lung sounds clear to auscultation bilaterally, no crackles, wheezes, rhonchi. Symmetric lung excursion, no accessory respiratory muscle use.  Cardiovascular: normal S1/S2, no murmurs/rubs/gallops.   Abdomen: Not distended.  : deferred.    Neurological:  Mental: alert, follows commands, Mini Cog Total Score: 5/5 with 3/3 on memory recall, no aphasia or dysarthria. Fund of knowledge is appropriate for age.  Cranial Nerves:  CN II: visual acuity - able to accurately count fingers with each eye. Visual fields intact, fundi: discs sharp, no papilledema and normal vessels bilaterally.  CN III, IV, VI: EOM intact, pupils equal and reactive  CN V: facial sensation nl  CN VII: face symmetric, no facial droop  CN VIII: hearing normal  CN IX: palate elevation symmetric, uvula at midline  CN XI SCM normal, shoulder shrug nl  CN XII: tongue midline  Motor: Strength: 5/5 in all major groups of all extremities. Normal tone. No abnormal movements. No pronator drift b/l.  Reflexes: Triceps, biceps, brachioradialis, patellar, and achilles reflexes normal and symmetric. No clonus noted. Toes are down-going b/l.   Sensory: temperature, light touch, pinprick, and vibration intact. Romberg: negative.  Coordination: FNF and heel-shin tests intact b/l.  Gait:  Normal, able to tandem, toe, and heel walk.  DATA:    LABS/IMAGING/OTHER STUDIES: I reviewed pertinent medical records, including Care Everywhere, as detailed in the history of present illness.  ASSESSMENT AND PLAN:      ASSESSMENT: Lorie Rosales is a 39 year old female patient with listed above past medical history, who presents with chronic headaches.    We had a prolonged discussion with the patient regarding her symptoms.  Her neurologic exam today is non-focal.  She did not have previous brain imaging, and currently does not have indications to do it.  Brain MRI without contrast might be considered in the future if her headaches continue to worsen, become resistant to treatment, or the patient develops focal neurological symptoms.    The clinical presentation is most likely consistent with multifactorial headache disorder, including tension type and medication overuse components.  We reviewed in detail these diagnoses, available treatment options, and the plan, as summarized below.    DIAGNOSES:    ICD-10-CM    1. Tension headache  G44.209    2. Medication overuse headache  G44.40      PLAN: At today's visit we thoroughly discussed various diagnostic possibilities for patient's symptoms that are most likely consistent with multifactorial headache disorder, including tension and medication overuse components.  We also reviewed available treatment options, possible future evaluation (brain MRI), and the plan.    For headache prevention:  1.  We decided to try riboflavin 400 mg every morning for the next 3 months.  I counseled the patient to contact my clinic with any intolerable side effects and give me an update in 4-6 weeks after starting this supplement.  2. Other future options include magnesium, Effexor, Nortriptyline, Amitriptyline, and gabapentin.  For acute headache therapy: I advised to try naproxen 500 mg, but take it with food and limit use of all analgesics to less that 15 days/month to prevent rebound headaches.    Reviewed non-pharmacological  headache prevention measures include proper sleep hygiene, regular meals, adequate hydration, regular aerobic exercise, stress reduction techniques, and limiting caffeine intake.    I instructed the patient to keep the headache diary and bring it to the next follow up visit or upload it via My Chart.    Next follow-up appointment is in the next 3 months or earlier if needed.    Total Time:  81 minutes with > 50% spent counseling the patient on stated above assessment and recommendations, including nature of the diagnosis, possible future w/u, available treatment options, and proposed plan.  Additional time was used to answer questions regarding patient's symptoms, my recommendations, and the plan.    Connor Valladares MD  [facility]  [place]  (Chart documentation was completed in part with Dragon voice-recognition software. Even though reviewed, some grammatical, spelling, and word errors may remain.)

## 2020-08-05 ENCOUNTER — OFFICE VISIT (OUTPATIENT)
Dept: NEUROLOGY | Facility: CLINIC | Age: 40
End: 2020-08-05
Payer: COMMERCIAL

## 2020-08-05 VITALS
HEART RATE: 81 BPM | TEMPERATURE: 98.1 F | DIASTOLIC BLOOD PRESSURE: 60 MMHG | BODY MASS INDEX: 18.7 KG/M2 | SYSTOLIC BLOOD PRESSURE: 98 MMHG | OXYGEN SATURATION: 99 % | WEIGHT: 106.4 LBS

## 2020-08-05 DIAGNOSIS — G44.40 MEDICATION OVERUSE HEADACHE: ICD-10-CM

## 2020-08-05 DIAGNOSIS — G44.209 TENSION HEADACHE: Primary | ICD-10-CM

## 2020-08-05 PROCEDURE — 99245 OFF/OP CONSLTJ NEW/EST HI 55: CPT | Performed by: PSYCHIATRY & NEUROLOGY

## 2020-08-05 NOTE — PATIENT INSTRUCTIONS
AFTER VISIT SUMMARY (AVS):    At today's visit we thoroughly discussed various diagnostic possibilities for your symptoms that are most likely consistent with multifactorial headache disorder, including tension and medication overuse components.  We also reviewed available treatment options, possible future evaluation (brain MRI), and the plan.    For headache prevention:  1.  Riboflavin 400 mg every morning for the next 3 months.  Please contact my clinic with any intolerable side effects and give me an update in 4-6 weeks after starting this supplement.  2. Other future options include magnesium, Effexor, Nortriptyline, Amitriptyline, and gabapentin.  For acute headache therapy: Naproxen 500 mg, but take it with food and limit use of all analgesics to less that 15 days/month to prevent rebound headaches.    Reviewed non-pharmacological headache prevention measures include proper sleep hygiene, regular meals, adequate hydration, regular aerobic exercise, stress reduction techniques, and limiting caffeine intake.    Please keep the headache diary and bring it to the next follow up visit or upload it via My Chart.    Next follow-up appointment is in the next 3 months or earlier if needed.    Please do not hesitate to call me with any questions or concerns.    Thanks.

## 2020-08-05 NOTE — LETTER
8/5/2020         RE: Lorie Rosales  [address] [facility] [address]  [place] [address]        Dear Colleague,    Thank you for referring your patient, Lorie Rosales, to the [facility]. Please see a copy of my visit note below.    INITIAL NEUROLOGY CONSULTATION    DATE OF VISIT: 8/5/2020  CLINIC LOCATION: [facility]  MRN: [id number]  PATIENT NAME: Lorie Rosales  YOB: 1980    PRIMARY CARE PROVIDER: Geetha June MD     REASON FOR VISIT:   Chief Complaint   Patient presents with     Headache     HISTORY OF PRESENT ILLNESS:                                                    Ms. Lorie Rosales is 39 year old right handed female patient without significant past medical history, who was seen in consultation today requested by Geetha June MD, for headache.    Per patient's report, she has history of persistent headaches.  She treats it with daily Tylenol and/or ibuprofen without significant relief.  Never had full evaluation for headaches.  Also had severe vertigo episodes in 2014 and in 2016.  In 2016 felt to be triggered by possible migraines.  She was recently seen by primary care provider, who referred her to neurology.    At the present time, intermittent throbbing/burning 4-7/10 headache occurs randomly during the day or night lasting for several hours.  It is located on the top of the head or could be holocephalic.  She might have daily headaches for 2 to 3 weeks followed by 1 week of headache-free period.  Associated symptoms include neck and shoulder discomfort.  Focused activities and working on the computer make them worse.  Using analgesics makes them better.  Takes Tylenol and/or ibuprofen on more than 15 days/month.  No other treatments tried.      The patient reports adequate restful sleep.  She eats 3 meals per day and drinks approximately 24 ounces of fluids and 2 to 3 cups of coffee without additional caffeinated beverages.  She rates her  stress level as moderate.  Reports 1 remote head injury during childhood without sequelae.  No prior history of seizures or brain infections.  Denies any focal neurological symptoms.    Recent laboratory evaluation from July 2020 includes unremarkable BMP, normal TSH (0.92), and normal CBC.    No prior brain imaging.  No additional useful information is available in Care Everywhere, which was reviewed.    Review of Systems - the patient endorses history of fever, fatigue, recent weight gain, chest pain, heart murmur, irregular heartbeat, memory problems, tinnitus, and shortness of breath.  All of them have been previously discussed with other medical providers. Otherwise, she denies any other complaints on 14-point comprehensive review of systems.  PAST MEDICAL/SURGICAL HISTORY:                                                    I personally reviewed patient's past medical and surgical history with the patient at today's visit.  History reviewed. No pertinent past medical history.  History reviewed. No pertinent surgical history.  MEDICATIONS:                                                    I personally reviewed patient's medications and allergies with the patient at today's visit.  FALMINA 0.1-20 MG-MCG tablet, TAKE ONE TABLET BY MOUTH ONCE DAILY  penicillin V (VEETID) 500 MG tablet, Take 1 tablet (500 mg) by mouth 2 times daily    No current facility-administered medications on file prior to visit.     ALLERGIES:                                                    No Known Allergies  FAMILY/SOCIAL HISTORY:                                                    Family and social history was reviewed with the patient at today's visit.  Family history is positive for brain hemorrhage (mother, no brain aneurysm was found during her evaluation), headache, dementia/Alzheimer's disease.  , lives with family.  Works full-time for the [employer].  Never smoker.  Denies current alcohol and recreational drug use.  REVIEW OF  SYSTEMS:                                                    Patient has completed a [facility] Patient Health History, including a 14-system review, which was personally reviewed, and pertinent positives are listed in HPI. She denies any additional problems on the further questioning.  EXAM:                                                    VITAL SIGNS:   BP 98/60   Pulse 81   Temp 98.1  F (36.7  C) (Oral)   Wt 48.3 kg (106 lb 6.4 oz)   SpO2 99%   BMI 18.70 kg/m    Mini-Cog Assessment:  Mini Cog Assessment  Clock Draw Score: 2 Normal  3 Item Recall: 3 objects recalled  Mini Cog Total Score: 5    General: pt is in NAD, cooperative.  Skin: normal turgor, moist mucous membranes, no lesions/rashes noticed.  HEENT: ATNC, EOMI, PERRL, white sclera, normal conjunctiva, no nystagmus or ptosis. No carotid bruits bilaterally.  Respiratory: lung sounds clear to auscultation bilaterally, no crackles, wheezes, rhonchi. Symmetric lung excursion, no accessory respiratory muscle use.  Cardiovascular: normal S1/S2, no murmurs/rubs/gallops.   Abdomen: Not distended.  : deferred.    Neurological:  Mental: alert, follows commands, Mini Cog Total Score: 5/5 with 3/3 on memory recall, no aphasia or dysarthria. Fund of knowledge is appropriate for age.  Cranial Nerves:  CN II: visual acuity - able to accurately count fingers with each eye. Visual fields intact, fundi: discs sharp, no papilledema and normal vessels bilaterally.  CN III, IV, VI: EOM intact, pupils equal and reactive  CN V: facial sensation nl  CN VII: face symmetric, no facial droop  CN VIII: hearing normal  CN IX: palate elevation symmetric, uvula at midline  CN XI SCM normal, shoulder shrug nl  CN XII: tongue midline  Motor: Strength: 5/5 in all major groups of all extremities. Normal tone. No abnormal movements. No pronator drift b/l.  Reflexes: Triceps, biceps, brachioradialis, patellar, and achilles reflexes normal and symmetric. No clonus noted.  Toes are down-going b/l.   Sensory: temperature, light touch, pinprick, and vibration intact. Romberg: negative.  Coordination: FNF and heel-shin tests intact b/l.  Gait:  Normal, able to tandem, toe, and heel walk.  DATA:   LABS/IMAGING/OTHER STUDIES: I reviewed pertinent medical records, including Care Everywhere, as detailed in the history of present illness.  ASSESSMENT AND PLAN:      ASSESSMENT: Lorie Rosales is a 39 year old female patient with listed above past medical history, who presents with chronic headaches.    We had a prolonged discussion with the patient regarding her symptoms.  Her neurologic exam today is non-focal.  She did not have previous brain imaging, and currently does not have indications to do it.  Brain MRI without contrast might be considered in the future if her headaches continue to worsen, become resistant to treatment, or the patient develops focal neurological symptoms.    The clinical presentation is most likely consistent with multifactorial headache disorder, including tension type and medication overuse components.  We reviewed in detail these diagnoses, available treatment options, and the plan, as summarized below.    DIAGNOSES:    ICD-10-CM    1. Tension headache  G44.209    2. Medication overuse headache  G44.40      PLAN: At today's visit we thoroughly discussed various diagnostic possibilities for patient's symptoms that are most likely consistent with multifactorial headache disorder, including tension and medication overuse components.  We also reviewed available treatment options, possible future evaluation (brain MRI), and the plan.    For headache prevention:  1.  We decided to try riboflavin 400 mg every morning for the next 3 months.  I counseled the patient to contact my clinic with any intolerable side effects and give me an update in 4-6 weeks after starting this supplement.  2. Other future options include magnesium, Effexor, Nortriptyline, Amitriptyline, and  gabapentin.  For acute headache therapy: I advised to try naproxen 500 mg, but take it with food and limit use of all analgesics to less that 15 days/month to prevent rebound headaches.    Reviewed non-pharmacological headache prevention measures include proper sleep hygiene, regular meals, adequate hydration, regular aerobic exercise, stress reduction techniques, and limiting caffeine intake.    I instructed the patient to keep the headache diary and bring it to the next follow up visit or upload it via My Chart.    Next follow-up appointment is in the next 3 months or earlier if needed.    Total Time:  81 minutes with > 50% spent counseling the patient on stated above assessment and recommendations, including nature of the diagnosis, possible future w/u, available treatment options, and proposed plan.  Additional time was used to answer questions regarding patient's symptoms, my recommendations, and the plan.    Connor Valladares MD  [facility]  [place]  (Chart documentation was completed in part with Dragon voice-recognition software. Even though reviewed, some grammatical, spelling, and word errors may remain.)            Again, thank you for allowing me to participate in the care of your patient.        Sincerely,        Connor Valladares MD

## 2020-09-15 ENCOUNTER — NURSE TRIAGE (OUTPATIENT)
Dept: NURSING | Facility: CLINIC | Age: 40
End: 2020-09-15

## 2020-09-15 ENCOUNTER — OFFICE VISIT (OUTPATIENT)
Dept: URGENT CARE | Facility: URGENT CARE | Age: 40
End: 2020-09-15
Payer: COMMERCIAL

## 2020-09-15 VITALS
WEIGHT: 105 LBS | BODY MASS INDEX: 18.61 KG/M2 | HEART RATE: 74 BPM | SYSTOLIC BLOOD PRESSURE: 112 MMHG | HEIGHT: 63 IN | TEMPERATURE: 95.8 F | DIASTOLIC BLOOD PRESSURE: 78 MMHG | RESPIRATION RATE: 14 BRPM

## 2020-09-15 DIAGNOSIS — R42 VERTIGO: Primary | ICD-10-CM

## 2020-09-15 DIAGNOSIS — R11.2 NAUSEA AND VOMITING, INTRACTABILITY OF VOMITING NOT SPECIFIED, UNSPECIFIED VOMITING TYPE: ICD-10-CM

## 2020-09-15 PROCEDURE — 96372 THER/PROPH/DIAG INJ SC/IM: CPT | Performed by: INTERNAL MEDICINE

## 2020-09-15 PROCEDURE — 99214 OFFICE O/P EST MOD 30 MIN: CPT | Mod: 25 | Performed by: INTERNAL MEDICINE

## 2020-09-15 RX ORDER — ONDANSETRON 4 MG/1
8 TABLET, ORALLY DISINTEGRATING ORAL ONCE
Status: COMPLETED | OUTPATIENT
Start: 2020-09-15 | End: 2020-09-15

## 2020-09-15 RX ORDER — ONDANSETRON 8 MG/1
8 TABLET, ORALLY DISINTEGRATING ORAL EVERY 8 HOURS PRN
Qty: 20 TABLET | Refills: 0 | Status: SHIPPED | OUTPATIENT
Start: 2020-09-15 | End: 2022-08-16

## 2020-09-15 RX ORDER — MECLIZINE HYDROCHLORIDE 25 MG/1
25 TABLET ORAL 3 TIMES DAILY PRN
Qty: 20 TABLET | Refills: 0 | Status: SHIPPED | OUTPATIENT
Start: 2020-09-15 | End: 2022-08-16

## 2020-09-15 RX ADMIN — ONDANSETRON 8 MG: 4 TABLET, ORALLY DISINTEGRATING ORAL at 16:00

## 2020-09-15 ASSESSMENT — ENCOUNTER SYMPTOMS
PALPITATIONS: 0
SORE THROAT: 0
CHILLS: 1
HEADACHES: 0
SLEEP DISTURBANCE: 0
DIAPHORESIS: 1
NAUSEA: 1
FEVER: 0
VOMITING: 1
RHINORRHEA: 0
MYALGIAS: 0
COUGH: 0

## 2020-09-15 ASSESSMENT — MIFFLIN-ST. JEOR: SCORE: 1114.37

## 2020-09-15 NOTE — PROGRESS NOTES
SUBJECTIVE:   Lorie Rosales is a 40 year old female presenting with a chief complaint of   Chief Complaint   Patient presents with     Urgent Care     Dizziness     started last night, dry heaving today. Bad Vertigo.        She is an established patient of [facility].    Adult    Onset of symptoms was last night felt dizzy  Ringing in left ear  This morning woke up with worse dizzy with room spinning  Needs to lay down.  Shifting positions causing dizzyness  Course of illness is worsen today  Current and Associated symptoms: no URI or allergy symptoms   Treatment measures tried include dramaine.  Predisposing factors include .  Hx previous episode    Hx 2014 ER visit, 2016 vertigo less severe  Tested for meniere's       Review of Systems   Constitutional: Positive for chills and diaphoresis. Negative for fever.   HENT: Positive for tinnitus. Negative for ear pain, hearing loss, rhinorrhea and sore throat.    Eyes: Negative for visual disturbance.   Respiratory: Negative for cough.    Cardiovascular: Negative for palpitations.   Gastrointestinal: Positive for nausea and vomiting (3 x today).   Genitourinary: Negative for decreased urine volume.   Musculoskeletal: Negative for myalgias.   Skin: Negative for rash.   Neurological: Negative for headaches.   Psychiatric/Behavioral: Negative for sleep disturbance.       Past Medical History:   Diagnosis Date     Tension headache     Neuro consult     Vertigo      Family History   Problem Relation Age of Onset     Aneurysm Mother 49        brain     Current Outpatient Medications   Medication Sig Dispense Refill     FALMINA 0.1-20 MG-MCG tablet TAKE ONE TABLET BY MOUTH ONCE DAILY 84 tablet 3     meclizine (ANTIVERT) 25 MG tablet Take 1 tablet (25 mg) by mouth 3 times daily as needed for dizziness 20 tablet 0     ondansetron (ZOFRAN-ODT) 8 MG ODT tab Take 1 tablet (8 mg) by mouth every 8 hours as needed for nausea 20 tablet 0     Social History     Tobacco Use     Smoking  "status: Never Smoker     Smokeless tobacco: Never Used   Substance Use Topics     Alcohol use: No     Frequency: Never       OBJECTIVE  /78   Pulse 74   Temp 95.8  F (35.4  C) (Tympanic)   Resp 14   Ht 1.607 m (5' 3.25\")   Wt 47.6 kg (105 lb)   LMP 08/26/2020   Breastfeeding No   BMI 18.45 kg/m      Physical Exam  Vitals signs reviewed.   Constitutional:       Appearance: Normal appearance.      Comments: Appears nauseated  No able to move due to nausea  Prefers to sit in one place  Performed exam with her sitting in chair  Holding emesis bag.  Given zofran 8 mg sl   HENT:      Right Ear: Tympanic membrane normal.      Left Ear: Tympanic membrane normal.      Mouth/Throat:      Mouth: Mucous membranes are moist.   Eyes:      Extraocular Movements: Extraocular movements intact.      Conjunctiva/sclera: Conjunctivae normal.      Pupils: Pupils are equal, round, and reactive to light.      Comments: No nystagus   Cardiovascular:      Rate and Rhythm: Normal rate and regular rhythm.      Pulses: Normal pulses.      Heart sounds: Normal heart sounds.   Pulmonary:      Effort: Pulmonary effort is normal.      Breath sounds: Normal breath sounds.   Abdominal:      Palpations: Abdomen is soft.      Tenderness: There is no abdominal tenderness.   Neurological:      General: No focal deficit present.      Mental Status: She is alert.      Cranial Nerves: No cranial nerve deficit.      Sensory: No sensory deficit.      Motor: No weakness.   Psychiatric:         Mood and Affect: Mood normal.         Labs:  No results found for this or any previous visit (from the past 24 hour(s)).    Patient was given 8 mg sublingual Zofran during rooming process.  She had improved towards end of appointment although decided to give her dose of Compazine IM because she still needed to hold the emesis bag and will still nervous about movement.    Plan was for her to go home and take her first dose of meclizine if she had not had " significant improvement after the Compazine otherwise take the meclizine 3 times a day.  She was getting multiple phone numbers to try to find an ear nose and throat clinic or rehab that has a provider that could perform the Epley maneuver    ASSESSMENT:      ICD-10-CM    1. Vertigo  R42 AUDIOLOGY BALANCE REFERRAL     meclizine (ANTIVERT) 25 MG tablet     OTOLARYNGOLOGY REFERRAL     ondansetron (ZOFRAN-ODT) 8 MG ODT tab     prochlorperazine (COMPAZINE) injection 10 mg     INJECTION INTRAMUSCULAR OR SUB-Q   2. Nausea and vomiting, intractability of vomiting not specified, unspecified vomiting type  R11.2 ondansetron (ZOFRAN-ODT) ODT tab 8 mg     meclizine (ANTIVERT) 25 MG tablet     OTOLARYNGOLOGY REFERRAL     ondansetron (ZOFRAN-ODT) 8 MG ODT tab     INJECTION INTRAMUSCULAR OR SUB-Q        Medical Decision Making:  This is the third episode of vertigo  Patient has no known etiologies.  Currently no other symptoms of illness to suggest cause of this episode of vertigo  Due to disabling nature of her current vertigo, she could benefit from the Epley maneuver    Patient Instructions     You were given zofran under tongue and compazine shot    zofran for nausea  Meclizine 25 mg 3 x day as needed.    Fluids    Otolaryngology consult/rehab consult  Contact to see if able to have EPLY maneuver         Patient Education     Benign Paroxysmal Positional Vertigo     Your health care provider may move your head in certain ways to treat your BPPV.     Benign paroxysmal positional vertigo (BPPV) is a problem with the inner ear. The inner ear contains the vestibular system. This system is what helps you keep your balance. BPPV causes a feeling of spinning. It is a common problem of the vestibular system.  Understanding the vestibular system  The vestibular system of the ear is made up of very tiny parts. They include the utricle, saccule, and semicircular canals. The utricle is a tiny organ that contains calcium crystals. In some  people, the crystals can move into the semicircular canals. When this happens, the system no longer works as it should. This causes BPPV. Benign means it is not life threatening. Paroxysmal means it happens suddenly. Positional means that it happens when you move your head. Vertigo is a feeling of spinning.  What causes BPPV?  Causes include injury to your head or neck. Other problems with the vestibular system may cause BPPV. In many people, the cause of BPPV is not known.  Symptoms of BPPV  You many have repeated feelings of spinning (vertigo). The vertigo usually lasts less than 1 minute. Some movements, such as rolling over in bed, can bring on vertigo.  Diagnosing BPPV  Your primary healthcare provider may diagnose and treat your BPPV. Or you may see an ear, nose, and throat doctor (otolaryngologist). In some cases, you may see a nervous system doctor (neurologist).  The healthcare provider will ask about your symptoms and your medical history. He or she will examine you. You may have hearing and balance tests. As part of the exam, your healthcare provider may have you move your head and body in certain ways. If you have BPPV, the movements can bring on vertigo. Your provider will also look for abnormal movements of your eyes. You may have other tests to check your vestibular or nervous systems.  Treatment for BPPV  Your healthcare provider may try to move the calcium crystals. This is done by having you move your head and neck in certain ways. This treatment is safe and often works well. You may also be told to do these movements at home. You may still have vertigo for a few weeks. Your healthcare provider will recheck your symptoms, usually in about a month. Special physical therapy may also be part of treatment. In rare cases, surgery may be needed for BPPV that does not go away.     When to call the healthcare provider  Call your healthcare provider right away if you have any of these:    Symptoms that do  not go away with treatment    Symptoms that get worse    New symptoms   Date Last Reviewed: 5/1/2017 2000-2019 [employer] [address] [facility], [place] [address]. All rights reserved. This information is not intended as a substitute for professional medical care. Always follow your healthcare professional's instructions.           Patient Education     Dizziness (Vertigo) and Balance Problems: Staying Safe     Replace burned-out light bulbs to keep your home safe and well lit.     Falls or accidents can lead to pain, broken bones, and fear of future falls. Protect yourself and others by preparing for episodes. Simple steps can help you stay safe at home and wherever you go.  Lighting  Keep all areas well lit. This helps your eyes send the right signals to the brain. It also makes you less likely to trip and fall. If bright lights make symptoms worse, dim the lights or lie in a dark room until the dizziness passes. Then turn the lights back to their normal level.  Tips:    Keep a flashlight by the bed.    Place nightlights in bathrooms and hallways.    Replace burned-out bulbs, or have someone replace them for you.  Preventing falls  To reduce your risk of falling:    Get out of bed or up from a chair slowly.    Wear low-heeled shoes that fit properly and have slip-resistant soles.    Remove throw rugs. Clear clutter from walkways.    Use handrails on stairs. Have handrails installed or adjusted if needed.    Install grab bars in the bathroom. Don't use towel racks for balance.    Use a shower stool. Also put adhesive strips in the shower or on the tub floor.  Going out  With a little time and preparation, you can get around safely.  Tips:    Bring a cane or walking aid if needed.    Give yourself plenty of time in case you start to get dizzy.    Ask your healthcare provider what type of exercise is safe for your condition.    Be patient. If an activity such as walking through a crowded shop  causes you stress, you may not be ready for it yet.  Driving  If you become dizzy or disoriented while driving, you could hurt yourself and others. That's why it's best to not drive until symptoms have gone away. In some cases, your license may be temporarily held until it's safe for you to drive again.  For safety:    Ask a friend to drive for you.    Take public transportation.    Walk to stores and other places when you can.  Asking for help  Don't be afraid to ask for help running errands, cooking meals, and doing exercise. Whether it's a friend, loved one, neighbor, or stranger on the street, a little help can make a world of difference.   Date Last Reviewed: 11/1/2016 2000-2019 [employer]. [address] [place] [address], [place] [address]. All rights reserved. This information is not intended as a substitute for professional medical care. Always follow your healthcare professional's instructions.

## 2020-09-15 NOTE — PATIENT INSTRUCTIONS
You were given zofran under tongue and compazine shot    zofran for nausea  Meclizine 25 mg 3 x day as needed.    Fluids    Otolaryngology consult/rehab consult  Contact to see if able to have EPLY maneuver         Patient Education     Benign Paroxysmal Positional Vertigo     Your health care provider may move your head in certain ways to treat your BPPV.     Benign paroxysmal positional vertigo (BPPV) is a problem with the inner ear. The inner ear contains the vestibular system. This system is what helps you keep your balance. BPPV causes a feeling of spinning. It is a common problem of the vestibular system.  Understanding the vestibular system  The vestibular system of the ear is made up of very tiny parts. They include the utricle, saccule, and semicircular canals. The utricle is a tiny organ that contains calcium crystals. In some people, the crystals can move into the semicircular canals. When this happens, the system no longer works as it should. This causes BPPV. Benign means it is not life threatening. Paroxysmal means it happens suddenly. Positional means that it happens when you move your head. Vertigo is a feeling of spinning.  What causes BPPV?  Causes include injury to your head or neck. Other problems with the vestibular system may cause BPPV. In many people, the cause of BPPV is not known.  Symptoms of BPPV  You many have repeated feelings of spinning (vertigo). The vertigo usually lasts less than 1 minute. Some movements, such as rolling over in bed, can bring on vertigo.  Diagnosing BPPV  Your primary healthcare provider may diagnose and treat your BPPV. Or you may see an ear, nose, and throat doctor (otolaryngologist). In some cases, you may see a nervous system doctor (neurologist).  The healthcare provider will ask about your symptoms and your medical history. He or she will examine you. You may have hearing and balance tests. As part of the exam, your healthcare provider may have you move your  head and body in certain ways. If you have BPPV, the movements can bring on vertigo. Your provider will also look for abnormal movements of your eyes. You may have other tests to check your vestibular or nervous systems.  Treatment for BPPV  Your healthcare provider may try to move the calcium crystals. This is done by having you move your head and neck in certain ways. This treatment is safe and often works well. You may also be told to do these movements at home. You may still have vertigo for a few weeks. Your healthcare provider will recheck your symptoms, usually in about a month. Special physical therapy may also be part of treatment. In rare cases, surgery may be needed for BPPV that does not go away.     When to call the healthcare provider  Call your healthcare provider right away if you have any of these:    Symptoms that do not go away with treatment    Symptoms that get worse    New symptoms   Date Last Reviewed: 5/1/2017 2000-2019 [employer]. [address] [place] [address]. All rights reserved. This information is not intended as a substitute for professional medical care. Always follow your healthcare professional's instructions.           Patient Education     Dizziness (Vertigo) and Balance Problems: Staying Safe     Replace burned-out light bulbs to keep your home safe and well lit.     Falls or accidents can lead to pain, broken bones, and fear of future falls. Protect yourself and others by preparing for episodes. Simple steps can help you stay safe at home and wherever you go.  Lighting  Keep all areas well lit. This helps your eyes send the right signals to the brain. It also makes you less likely to trip and fall. If bright lights make symptoms worse, dim the lights or lie in a dark room until the dizziness passes. Then turn the lights back to their normal level.  Tips:    Keep a flashlight by the bed.    Place nightlights in bathrooms and hallways.    Replace  burned-out bulbs, or have someone replace them for you.  Preventing falls  To reduce your risk of falling:    Get out of bed or up from a chair slowly.    Wear low-heeled shoes that fit properly and have slip-resistant soles.    Remove throw rugs. Clear clutter from walkways.    Use handrails on stairs. Have handrails installed or adjusted if needed.    Install grab bars in the bathroom. Don't use towel racks for balance.    Use a shower stool. Also put adhesive strips in the shower or on the tub floor.  Going out  With a little time and preparation, you can get around safely.  Tips:    Bring a cane or walking aid if needed.    Give yourself plenty of time in case you start to get dizzy.    Ask your healthcare provider what type of exercise is safe for your condition.    Be patient. If an activity such as walking through a crowded shop causes you stress, you may not be ready for it yet.  Driving  If you become dizzy or disoriented while driving, you could hurt yourself and others. That's why it's best to not drive until symptoms have gone away. In some cases, your license may be temporarily held until it's safe for you to drive again.  For safety:    Ask a friend to drive for you.    Take public transportation.    Walk to stores and other places when you can.  Asking for help  Don't be afraid to ask for help running errands, cooking meals, and doing exercise. Whether it's a friend, loved one, neighbor, or stranger on the street, a little help can make a world of difference.   Date Last Reviewed: 11/1/2016 2000-2019 [employer]. [address], [place] [address]. All rights reserved. This information is not intended as a substitute for professional medical care. Always follow your healthcare professional's instructions.

## 2020-09-15 NOTE — TELEPHONE ENCOUNTER
Spouse calling; patient present.  Patient woke with severe dizziness this morning.  Has history of vertigo and prescribed medication in the past.  Patient has been unable to get out of bed.  Asking if any OTC medications she can take.  Advised to go to ED/UC now; will go to [facility].    Additional Information    Negative: Shock suspected (e.g., cold/pale/clammy skin, too weak to stand, low BP, rapid pulse)    Negative: Difficult to awaken or acting confused (e.g., disoriented, slurred speech)    Negative: Fainted, and still feels dizzy afterwards    Negative: Severe difficulty breathing (e.g., struggling for each breath, speaks in single words)    Negative: Overdose (accidental or intentional) of medications    Negative: New neurologic deficit that is present now: * Weakness of the face, arm, or leg on one side of the body * Numbness of the face, arm, or leg on one side of the body * Loss of speech or garbled speech    Negative: Heart beating < 50 beats per minute OR > 140 beats per minute    Negative: Sounds like a life-threatening emergency to the triager    Negative: Chest pain    Negative: Rectal bleeding, bloody stool, or tarry-black stool    Negative: Vomiting is the main symptom    Negative: Diarrhea is the main symptom    Negative: Headache is the main symptom    Negative: Heat exhaustion suspected (i.e., dehydration from heat exposure)    Negative: Patient states that he/she is having an anxiety/panic attack    SEVERE dizziness (e.g., unable to stand, requires support to walk, feels like passing out now)    Protocols used: DIZZINESS-A-OH

## 2020-09-25 ENCOUNTER — TELEPHONE (OUTPATIENT)
Dept: OTOLARYNGOLOGY | Facility: CLINIC | Age: 40
End: 2020-09-25

## 2020-09-25 NOTE — TELEPHONE ENCOUNTER
[facility] Call Center    Phone Message    May a detailed message be left on voicemail: no     Reason for Call: Other: Referred by Dr. Dimas for vertigo (also noticed patient also has audio balance referral)     Action Taken: Message routed to:  [facility] ([facility]): CLINIC COORDINATORS-ENT-EYE-DENTAL [[id number]]    Travel Screening: Not Applicable

## 2020-09-28 NOTE — TELEPHONE ENCOUNTER
Records Requested  09/28/20 @ 2:23PM  9/29/2020 10:10AM    Facility  [facility]    [address]    [place] [address]    Ph.[phone]   Med recs is through [facility]   Ph. [phone]  Fx. [phone]   Outcome Sent a fax for 2016 records      9/29/2020 10:09AM sent a fax to [facility] called stating patient records are there not with them. - Amay   9/29/2020 4:05PM received 6/10/2016 audiogram, called and asked if they have any clinic notes, medical recs rep was unable to answer stating she can't see the encounters I gave her from care everywhere. Called and spoke with [facility]/ [facility], fax provided for recs is fx. [phone]. Sent a fax to the fax number provided. Also sent a fax to [phone] ([facility]) - Amay   10/5/2020 11:53AM received faxes back from various places, unable to locate ENT notes - amay

## 2020-10-07 ENCOUNTER — THERAPY VISIT (OUTPATIENT)
Dept: PHYSICAL THERAPY | Facility: CLINIC | Age: 40
End: 2020-10-07
Payer: COMMERCIAL

## 2020-10-07 DIAGNOSIS — R42 VERTIGO: ICD-10-CM

## 2020-10-07 PROCEDURE — 97161 PT EVAL LOW COMPLEX 20 MIN: CPT | Mod: GP | Performed by: PHYSICAL THERAPIST

## 2020-11-04 NOTE — PATIENT INSTRUCTIONS
AFTER VISIT SUMMARY (AVS):    At today's visit we thoroughly discussed current symptoms, available treatment options, and the plan.    We decided to proceed with brain MRI with and without contrast to evaluate for causes of vertigo.    For headache prevention we decided to continue riboflavin 400 mg every morning for the next 3 months.  Please contact my clinic in 4 to 6 weeks to update me on frequency of headaches via My Chart.  For acute therapy continue to use naproxen.    Please keep the headache diary and bring it to the next follow-up visit or upload via My Chart.    Next follow-up appointment is in the next 3 months or earlier if needed.    Please do not hesitate to call me with any questions or concerns.    Thanks.

## 2020-11-04 NOTE — PROGRESS NOTES
"ESTABLISHED PATIENT NEUROLOGY NOTE    DATE OF VISIT: 11/5/2020  CLINIC LOCATION: [facility]  MRN: [id number]  PATIENT NAME: Lorie Rosales  YOB: 1980    PCP: Geetha June MD    REASON FOR VISIT:   Chief Complaint   Patient presents with     Follow Up     HA - worse and had vertigo in september      SUBJECTIVE:                                                      HISTORY OF PRESENT ILLNESS: Patient is here to follow up regarding headache disorder.  She also wants to discuss a new symptom, recent episode of vertigo.  Please refer to my initial note from 8/5/2020 for further information.    Since the last visit, the patient reports that her headaches are worse, and she had an episode of vertigo in September 2020.    She reports that she had total of 3 episodes of severe vertigo that lasted for days.  First episode was in 2016, second in 2017, and most recent was in September 2020.  States that in the past she was evaluated for Ménière's disease.  With the latest episode she woke up with extreme vertigo in the morning.  She was unable to move and felt very dizzy (\"everything was spinning\").  Associated symptoms included cold sweat, muffled sound from the left ear and left ear tinnitus.  She was seen at urgent care and prescribed meclizine and Zofran that were helpful.  Her symptoms resolved in approximately 3 days.  She was seen by physical therapy approximately a month later, and no peripheral causes for her symptoms were found.  Therapist mentioned that her symptoms might be due to vestibular migraine.  Denies any recurrence.  Was not seen ENT specialist as recommended by urgent care provider.    Regarding her headaches she felt that they are notably improved in September, but after the episode of vertigo, she quit taking riboflavin, and her headaches worsened again in October.  She had 11 headache days according to her Migraine Darryl diary.  Takes riboflavin " inconsistently.  For acute therapy she uses naproxen, which works well.  No significant side effects.  No interval development of other new focal neurological symptoms.    On review of systems, patient endorses no additional active complaints. Medications, allergies, family and social history were also reviewed. There are no changes reported by patient.  REVIEW OF SYSTEMS:                                                    10-system review was completed. Pertinent positives are included in HPI. The remainder of ROS is negative.  EXAM:                                                    Physical Exam:   Vitals: /76   Pulse 73   Temp 98  F (36.7  C) (Oral)   Wt 48.7 kg (107 lb 6.4 oz)   LMP 10/23/2020 (Approximate)   SpO2 100%   BMI 18.87 kg/m      General: pt is in NAD, cooperative.  Skin: normal turgor, moist mucous membranes, no lesions/rashes noticed.  HEENT: ATNC, white sclera, normal conjunctiva.  Respiratory: Symmetric lung excursion, no accessory respiratory muscle use.  Abdomen: Non distended.  Neurological: awake, cooperative, follows commands, no aphasia or dysarthria noted, cranial nerves II-XII: no ptosis, face is symmetric, equally moves all extremities, no dysmetria bilaterally, casual gait is normal.  ASSESSMENT AND PLAN:                                                    Assessment: 40-year-old female patient with multifactorial headache disorder (tension and medication overuse) presents for a follow-up additionally reporting an episode of severe vertigo that lasted for 3 days and was associated with left-sided tinnitus and muffled sound.    We had a prolonged discussion with the patient regarding her current complaints.  She reports that her vertigo completely resolved at the present time, but she is concerned about possible recurrence.  I discussed with the patient that by the provided description it would unlikely fit vestibular migraine clinical picture, though it is not totally  excluded.  At the same time, I would like to exclude central causes of vertigo, including cerebellopontine lesions.  For that reason I ordered brain MRI with and without contrast.  Additional etiology includes Ménière's disease, even though that the previous work-up in 2017 was unrevealing.  I encouraged the patient to see ENT specialist.  If initial work-up is negative, we might consider a trial of Imitrex or similar medication to see if her episodes of vertigo will respond.  Verapamil would be a good choice of preventive medication if vertiginous episodes become more frequent.    At the same time, we also reviewed that her current description of headaches would not be consistent with migraines, and most likely represents a combination of tension headaches with medication overuse component.  It sounded that the riboflavin was helpful in the past, but she did not consistently take it to appreciate sustained effect.  I advised her to retry it for the next 3 months.  We also discussed that we could consider Taiwo-relief, Effexor, nortriptyline, amitriptyline, and gabapentin if riboflavin is not effective.  Naproxen seems to work well for acute therapy.    Diagnoses:    ICD-10-CM    1. Tension headache  G44.209    2. Medication overuse headache  G44.40      Plan: At today's visit we thoroughly discussed current symptoms, available treatment options, and the plan.    We decided to proceed with brain MRI with and without contrast to evaluate for causes of vertigo.    For headache prevention we decided to continue riboflavin 400 mg every morning for the next 3 months.  I counseled the patient to contact my clinic in 4 to 6 weeks to update me on frequency of headaches via My Chart.  For acute therapy she will continue to use naproxen.    I advised the patient to keep the headache diary and bring it to the next follow-up visit or upload via My Chart.    Next follow-up appointment is in the next 3 months or earlier if  needed.    Total Time: 71 minutes with > 50% spent counseling the patient on stated above assessment and recommendations.    Connor Valladares MD  [facility] Neurology

## 2020-11-05 ENCOUNTER — OFFICE VISIT (OUTPATIENT)
Dept: NEUROLOGY | Facility: CLINIC | Age: 40
End: 2020-11-05
Payer: COMMERCIAL

## 2020-11-05 VITALS
DIASTOLIC BLOOD PRESSURE: 76 MMHG | HEART RATE: 73 BPM | OXYGEN SATURATION: 100 % | TEMPERATURE: 98 F | BODY MASS INDEX: 18.87 KG/M2 | WEIGHT: 107.4 LBS | SYSTOLIC BLOOD PRESSURE: 110 MMHG

## 2020-11-05 DIAGNOSIS — G44.209 TENSION HEADACHE: Primary | ICD-10-CM

## 2020-11-05 DIAGNOSIS — R42 VERTIGO: ICD-10-CM

## 2020-11-05 DIAGNOSIS — G44.40 MEDICATION OVERUSE HEADACHE: ICD-10-CM

## 2020-11-05 PROCEDURE — 99215 OFFICE O/P EST HI 40 MIN: CPT | Performed by: PSYCHIATRY & NEUROLOGY

## 2020-11-05 PROCEDURE — 99354 PR PROLONGED SERV,OFFICE,1ST HR: CPT | Performed by: PSYCHIATRY & NEUROLOGY

## 2020-11-05 NOTE — LETTER
"    11/5/2020         RE: Lorie Rosales  [address]  [place] [address]        Dear Colleague,    Thank you for referring your patient, Lorie Rosales, to the [facility] [place]. Please see a copy of my visit note below.    ESTABLISHED PATIENT NEUROLOGY NOTE    DATE OF VISIT: 11/5/2020  CLINIC LOCATION: [facility]  MRN: [id number]  PATIENT NAME: Lorie Rosales  YOB: 1980    PCP: Geetha June MD    REASON FOR VISIT:   Chief Complaint   Patient presents with     Follow Up     HA - pato and had vertigo in september      SUBJECTIVE:                                                      HISTORY OF PRESENT ILLNESS: Patient is here to follow up regarding headache disorder.  She also wants to discuss a new symptom, recent episode of vertigo.  Please refer to my initial note from 8/5/2020 for further information.    Since the last visit, the patient reports that her headaches are worse, and she had an episode of vertigo in September 2020.    She reports that she had total of 3 episodes of severe vertigo that lasted for days.  First episode was in 2016, second in 2017, and most recent was in September 2020.  States that in the past she was evaluated for Ménière's disease.  With the latest episode she woke up with extreme vertigo in the morning.  She was unable to move and felt very dizzy (\"everything was spinning\").  Associated symptoms included cold sweat, muffled sound from the left ear and left ear tinnitus.  She was seen at urgent care and prescribed meclizine and Zofran that were helpful.  Her symptoms resolved in approximately 3 days.  She was seen by physical therapy approximately a month later, and no peripheral causes for her symptoms were found.  Therapist mentioned that her symptoms might be due to vestibular migraine.  Denies any recurrence.  Was not seen ENT specialist as recommended by urgent care provider.    Regarding " her headaches she felt that they are notably improved in September, but after the episode of vertigo, she quit taking riboflavin, and her headaches worsened again in October.  She had 11 headache days according to her Migraine Darryl diary.  Takes riboflavin inconsistently.  For acute therapy she uses naproxen, which works well.  No significant side effects.  No interval development of other new focal neurological symptoms.    On review of systems, patient endorses no additional active complaints. Medications, allergies, family and social history were also reviewed. There are no changes reported by patient.  REVIEW OF SYSTEMS:                                                    10-system review was completed. Pertinent positives are included in HPI. The remainder of ROS is negative.  EXAM:                                                    Physical Exam:   Vitals: /76   Pulse 73   Temp 98  F (36.7  C) (Oral)   Wt 48.7 kg (107 lb 6.4 oz)   LMP 10/23/2020 (Approximate)   SpO2 100%   BMI 18.87 kg/m      General: pt is in NAD, cooperative.  Skin: normal turgor, moist mucous membranes, no lesions/rashes noticed.  HEENT: ATNC, white sclera, normal conjunctiva.  Respiratory: Symmetric lung excursion, no accessory respiratory muscle use.  Abdomen: Non distended.  Neurological: awake, cooperative, follows commands, no aphasia or dysarthria noted, cranial nerves II-XII: no ptosis, face is symmetric, equally moves all extremities, no dysmetria bilaterally, casual gait is normal.  ASSESSMENT AND PLAN:                                                    Assessment: 40-year-old female patient with multifactorial headache disorder (tension and medication overuse) presents for a follow-up additionally reporting an episode of severe vertigo that lasted for 3 days and was associated with left-sided tinnitus and muffled sound.    We had a prolonged discussion with the patient regarding her current complaints.  She reports  that her vertigo completely resolved at the present time, but she is concerned about possible recurrence.  I discussed with the patient that by the provided description it would unlikely fit vestibular migraine clinical picture, though it is not totally excluded.  At the same time, I would like to exclude central causes of vertigo, including cerebellopontine lesions.  For that reason I ordered brain MRI with and without contrast.  Additional etiology includes Ménière's disease, even though that the previous work-up in 2017 was unrevealing.  I encouraged the patient to see ENT specialist.  If initial work-up is negative, we might consider a trial of Imitrex or similar medication to see if her episodes of vertigo will respond.  Verapamil would be a good choice of preventive medication if vertiginous episodes become more frequent.    At the same time, we also reviewed that her current description of headaches would not be consistent with migraines, and most likely represents a combination of tension headaches with medication overuse component.  It sounded that the riboflavin was helpful in the past, but she did not consistently take it to appreciate sustained effect.  I advised her to retry it for the next 3 months.  We also discussed that we could consider Taiwo-relief, Effexor, nortriptyline, amitriptyline, and gabapentin if riboflavin is not effective.  Naproxen seems to work well for acute therapy.    Diagnoses:    ICD-10-CM    1. Tension headache  G44.209    2. Medication overuse headache  G44.40      Plan: At today's visit we thoroughly discussed current symptoms, available treatment options, and the plan.    We decided to proceed with brain MRI with and without contrast to evaluate for causes of vertigo.    For headache prevention we decided to continue riboflavin 400 mg every morning for the next 3 months.  I counseled the patient to contact my clinic in 4 to 6 weeks to update me on frequency of headaches via My  Chart.  For acute therapy she will continue to use naproxen.    I advised the patient to keep the headache diary and bring it to the next follow-up visit or upload via My Chart.    Next follow-up appointment is in the next 3 months or earlier if needed.    Total Time: 71 minutes with > 50% spent counseling the patient on stated above assessment and recommendations.    Connor Valladares MD  / Neurology        Again, thank you for allowing me to participate in the care of your patient.        Sincerely,        Connor Valladares MD

## 2020-12-03 ENCOUNTER — ANCILLARY PROCEDURE (OUTPATIENT)
Dept: MRI IMAGING | Facility: CLINIC | Age: 40
End: 2020-12-03
Attending: PSYCHIATRY & NEUROLOGY
Payer: COMMERCIAL

## 2020-12-03 DIAGNOSIS — R42 VERTIGO: ICD-10-CM

## 2020-12-03 PROCEDURE — A9585 GADOBUTROL INJECTION: HCPCS | Performed by: RADIOLOGY

## 2020-12-03 PROCEDURE — 70553 MRI BRAIN STEM W/O & W/DYE: CPT | Performed by: RADIOLOGY

## 2020-12-03 RX ORDER — GADOBUTROL 604.72 MG/ML
7.5 INJECTION INTRAVENOUS ONCE
Status: COMPLETED | OUTPATIENT
Start: 2020-12-03 | End: 2020-12-03

## 2020-12-03 RX ADMIN — GADOBUTROL 4.5 ML: 604.72 INJECTION INTRAVENOUS at 16:06

## 2021-01-03 ENCOUNTER — HEALTH MAINTENANCE LETTER (OUTPATIENT)
Age: 41
End: 2021-01-03

## 2021-02-04 DIAGNOSIS — M41.9 SCOLIOSIS: Primary | ICD-10-CM

## 2021-02-05 ENCOUNTER — THERAPY VISIT (OUTPATIENT)
Dept: PHYSICAL THERAPY | Facility: CLINIC | Age: 41
End: 2021-02-05
Attending: PREVENTIVE MEDICINE
Payer: COMMERCIAL

## 2021-02-05 DIAGNOSIS — M41.9 SCOLIOSIS: ICD-10-CM

## 2021-02-05 PROCEDURE — 97110 THERAPEUTIC EXERCISES: CPT | Mod: GP | Performed by: PHYSICAL THERAPIST

## 2021-02-05 PROCEDURE — 97161 PT EVAL LOW COMPLEX 20 MIN: CPT | Mod: GP | Performed by: PHYSICAL THERAPIST

## 2021-02-05 NOTE — PROGRESS NOTES
[facility] Initial Evaluation  Subjective:    Patient Health History         Pain is reported as 7/10 on pain scale.    Pertinent medical history includes: migraines/headaches.   Red flags:  Severe dizziness.     Surgeries include:  Orthopedic surgery (back).     Other medications details: Birth control.    Current occupation is campus .   Primary job tasks include:  Computer work and prolonged sitting.                                    Objective:  System    Physical Exam    General     ROS    Assessment/Plan:

## 2021-02-05 NOTE — PROGRESS NOTES
[facility] Initial Evaluation  Subjective:  The history is provided by the patient.                       Objective:  System         Lumbar/SI Evaluation  ROM:    AROM Lumbar:   Flexion:            50% limited due to HS tightness and spinal tightness; no pain  Ext:                    25% limited with reproduction of L sided LBP   Side Bend:        Left:  50% limited with L pain    Right:  50% limited no pain  Rotation:           Left:  Full, no pain    Right:  Full, no pain  Side Glide:        Left:     Right:       AROM Thoracic:  Flex:              Ext:                 Rotation:        Left:  Full, no pain    Right:  Full, no pain                 Lumbar Palpation:  Palpation (lumbar): Increased tone along L spinal musculature, tender to palpation.                                                         [facility] Physical Therapy Initial Evaluation  2/5/2021     Precautions/Restrictions/MD instructions: Scoliosis management and L low back pain treatment  Order Date: 2/4/2021    Subjective:   Chief Complaint: Pt shares that she has hx of scoliosis and L sided back pain and feels it is getting worse. She would like to do PT to better manage sx and improve pain, strength and mobility. She does feel PT was helpful last year but life got busy and she discontinued coming.   Associated symptoms: None  CHAZ: traumatic vs insidious - insidious, scoliosis   Pain severity: 6-7/10   Location of pain: L lower spine  Quality of Pain: Dull ache   Better: Unsure, maybe exercise and movement  Worse:  Prolonged sitting  Time of day: End of day worse  Progression of Symptoms since onset:  Since onset, these symptoms are getting worse  Previous treatment: has included PT; effect was decrease in sx  Current Functional Status: able to do all ADLs and activities but with pain  Previous Functional Status:  fully functional prior to pain onset/injury.      General health as  reported by patient: good.    PMH: Vertigo/headaches   Surgical history/trauma: None. She denies any significant current illness or recent hospital admissions. She denies any regional implanted devices.  Scoliosis surgery in 1999 - improved sx following surgery but now worsening  Imaging: See Chart  Medications: Birth Control    Occupation: [employer] Job duties: prolonged sitting  Current exercise regimen/Recreational activities: 10 minutes of cardio or stretching, has looked up scoliosis specific stretches  Barriers to treatment: None    Red Flags: (Bold when present) - reviewed the following and denies  Calf pain/swelling/warmth      Patient's Goal(s): Decrease pain, improve strength and mobility. Decrease tone in muscles on L side. Learn how to manage her symptoms better as she is currently getting exercise advice from youtube.     Assessment/Plan:    Patient is a 40 year old female with lumbar complaints.    Patient has the following significant findings with corresponding treatment plan.                Diagnosis 1:  Scoliosis; L sided LBP  Pain -  education  Decreased ROM/flexibility - manual therapy and therapeutic exercise  Decreased joint mobility - manual therapy and therapeutic exercise  Decreased strength - therapeutic exercise and therapeutic activities  Decreased function - therapeutic activities    Cumulative Therapy Evaluation is: Low complexity.    Previous and current functional limitations:  (See Goal Flow Sheet for this information)    Short term and Long term goals: (See Goal Flow Sheet for this information)     Communication ability:  Patient appears to be able to clearly communicate and understand verbal and written communication and follow directions correctly.  Treatment Explanation - The following has been discussed with the patient:   RX ordered/plan of care  Anticipated outcomes  Possible risks and side effects  This patient would benefit from PT intervention to resume normal activities.    Rehab potential is excellent.    Frequency:  6 X week, once daily  Duration:  for 6 weeks  Discharge Plan:  Achieve all LTG.  Independent in home treatment program.  Reach maximal therapeutic benefit.    Please refer to the daily flowsheet for treatment today, total treatment time and time spent performing 1:1 timed codes.

## 2021-02-12 ENCOUNTER — THERAPY VISIT (OUTPATIENT)
Dept: PHYSICAL THERAPY | Facility: CLINIC | Age: 41
End: 2021-02-12
Payer: COMMERCIAL

## 2021-02-12 DIAGNOSIS — M41.9 SCOLIOSIS: ICD-10-CM

## 2021-02-12 PROCEDURE — 97110 THERAPEUTIC EXERCISES: CPT | Mod: GP | Performed by: PHYSICAL THERAPIST

## 2021-02-12 PROCEDURE — 97140 MANUAL THERAPY 1/> REGIONS: CPT | Mod: GP | Performed by: PHYSICAL THERAPIST

## 2021-02-12 PROCEDURE — 97112 NEUROMUSCULAR REEDUCATION: CPT | Mod: GP | Performed by: PHYSICAL THERAPIST

## 2021-02-19 ENCOUNTER — THERAPY VISIT (OUTPATIENT)
Dept: PHYSICAL THERAPY | Facility: CLINIC | Age: 41
End: 2021-02-19
Payer: COMMERCIAL

## 2021-02-19 DIAGNOSIS — M41.9 SCOLIOSIS: ICD-10-CM

## 2021-02-19 PROCEDURE — 97112 NEUROMUSCULAR REEDUCATION: CPT | Mod: GP | Performed by: PHYSICAL THERAPIST

## 2021-02-19 PROCEDURE — 97110 THERAPEUTIC EXERCISES: CPT | Mod: GP | Performed by: PHYSICAL THERAPIST

## 2021-02-19 PROCEDURE — 97140 MANUAL THERAPY 1/> REGIONS: CPT | Mod: GP | Performed by: PHYSICAL THERAPIST

## 2021-02-25 ENCOUNTER — OFFICE VISIT (OUTPATIENT)
Dept: NEUROSURGERY | Facility: CLINIC | Age: 41
End: 2021-02-25
Attending: PSYCHIATRY & NEUROLOGY
Payer: COMMERCIAL

## 2021-02-25 VITALS
DIASTOLIC BLOOD PRESSURE: 80 MMHG | WEIGHT: 109.8 LBS | BODY MASS INDEX: 18.74 KG/M2 | SYSTOLIC BLOOD PRESSURE: 113 MMHG | HEART RATE: 74 BPM | OXYGEN SATURATION: 98 % | HEIGHT: 64 IN

## 2021-02-25 DIAGNOSIS — G44.209 TENSION HEADACHE: Primary | ICD-10-CM

## 2021-02-25 DIAGNOSIS — G44.40 MEDICATION OVERUSE HEADACHE: ICD-10-CM

## 2021-02-25 DIAGNOSIS — R42 VERTIGO: ICD-10-CM

## 2021-02-25 PROCEDURE — 99214 OFFICE O/P EST MOD 30 MIN: CPT | Performed by: PSYCHIATRY & NEUROLOGY

## 2021-02-25 ASSESSMENT — PAIN SCALES - GENERAL: PAINLEVEL: NO PAIN (0)

## 2021-02-25 ASSESSMENT — MIFFLIN-ST. JEOR: SCORE: 1140.11

## 2021-02-25 NOTE — NURSING NOTE
"February 25, 2021 3:44 PM   Lorie Rosales is a 40 year old female who presents for:    Chief Complaint   Patient presents with     Consult     Tension headaches      Initial Vitals: /80   Pulse 74   Ht 5' 3.5\" (1.613 m)   Wt 109 lb 12.8 oz (49.8 kg)   SpO2 98%   BMI 19.15 kg/m   Estimated body mass index is 19.15 kg/m  as calculated from the following:    Height as of this encounter: 5' 3.5\" (1.613 m).    Weight as of this encounter: 109 lb 12.8 oz (49.8 kg). Body surface area is 1.49 meters squared.  No Pain (0) Comment: Data Unavailable       Clinical concerns: Lorie Rosales is here today for tension headaches.    Angel Vela MA  "

## 2021-02-25 NOTE — PROGRESS NOTES
ESTABLISHED PATIENT NEUROLOGY NOTE    DATE OF VISIT: 2/25/2021  CLINIC LOCATION: [facility]  MRN: [id number]  PATIENT NAME: Lorie Rosales  YOB: 1980    PCP: Geetha June MD    REASON FOR VISIT:   Chief Complaint   Patient presents with     Consult     Tension headaches      SUBJECTIVE:                                                    HISTORY OF PRESENT ILLNESS: Patient is here to follow up regarding headache disorder and vertigo.  Last visit was on 11/5/2020.  At that time I ordered brain MRI to evaluate for intracranial causes of vertigo and advised her to take riboflavin every day for headache prevention.  Please refer to my initial/other prior notes for further information.    Since the last visit, the patient reports that her headaches are stable.  We reviewed her headache diary together.  In November she had 3 headaches with one of them 8/10 and the rest of them 4-5/10, in December 2 headaches of moderate intensity, in January she had 2 and in February 3 headaches of moderate intensity.    For headache prevention, she is on riboflavin 400 mg evening even though I told her to take it in the morning.  She forgot it, and occasionally feels that her sleep is affected.  For acute therapy she takes naproxen, but feels that it loses its effectiveness.  For that reason she alternates it with ibuprofen and Tylenol.  She takes analgesics 2 to 4 days/week.  No significant side effects or interval development of new focal neurological symptoms.    She denies any additional episodes of vertigo.  She decided not to see ENT specialist for now.  Brain MRI with and without contrast from 12/3/2020 demonstrated no abnormal signal or enhancement along the course of his cranial nerves bilaterally or other abnormal findings.  These images were personally reviewed and independently interpreted.    On review of systems, patient endorses no additional active complaints. Medications,  "allergies, family and social history were also reviewed. There are no changes reported by patient.  REVIEW OF SYSTEMS:                                                    10-system review was completed. Pertinent positives are included in HPI. The remainder of ROS is negative.  EXAM:                                                    Physical Exam:   Vitals: /80   Pulse 74   Ht 5' 3.5\" (1.613 m)   Wt 109 lb 12.8 oz (49.8 kg)   SpO2 98%   BMI 19.15 kg/m      General: pt is in NAD, cooperative.  Skin: normal turgor, moist mucous membranes, no lesions/rashes noticed.  HEENT: ATNC, white sclera, normal conjunctiva.  Respiratory: Symmetric lung excursion, no accessory respiratory muscle use.  Abdomen: Non distended.  Neurological: awake, cooperative, follows commands, no exam changes compared to previous visit.  ASSESSMENT AND PLAN:                                                    Assessment: 40-year-old female patient with multifactorial (tension and medication overuse) headache disorder and vertigo presents for follow-up.  Her headaches are stable.  Vertigo did not recur.  Brain MRI was unrevealing.  In the past, we discussed use of verapamil and trial of Imitrex if ENT evaluation is unrevealing.    She reports that her headaches are less frequent on riboflavin, but she takes it in the evening and it affects her night sleep.  I advised her to switch her administration time to the morning to avoid this side effect.  In the past, we discussed additional preventive options of Taiwo-relief, Effexor, nortriptyline, amitriptyline, or gabapentin if riboflavin is not effective.  Naproxen seems to work well for acute therapy, but in my opinion she is overusing it and it leads in turn to perceived loss of efficacy requiring use of Tylenol and ibuprofen.  We discussed the mechanism of rebound headaches and strategies to improve it.    Diagnoses:    ICD-10-CM    1. Tension headache  G44.209    2. Medication overuse " headache  G44.40    3. Vertigo  R42      Plan: At today's visit we thoroughly discussed current symptoms, brain MRI results (normal), available treatment options, and the plan.  I am pleased to hear that patient's headaches remain stable.    We decided to switch riboflavin administration to morning to prevent insomnia.    I advised the patient to limit use of all acute analgesics to less than 15 days/month to address medication overuse headaches.    I instructed her to keep the headache diary and bring it to the next follow-up visit or upload via My Chart.    I recommended to come back at any time if she has additional episodes of vertigo.    Next follow-up appointment is in the next 3 months or earlier if needed.    Total Time: 32 minutes spent on the date of the encounter doing chart review, history and exam, documentation and further activities as noted above.    Connor Valladares MD  [facility] Neurology  (Chart documentation was completed in part with Dragon voice-recognition software. Even though reviewed, some grammatical, spelling, and word errors may remain.)

## 2021-02-25 NOTE — PATIENT INSTRUCTIONS
AFTER VISIT SUMMARY (AVS):    At today's visit we thoroughly discussed current symptoms, brain MRI results (normal), available treatment options, and the plan.  I am pleased to hear that your headaches remain stable.    Please switch time you take riboflavin to morning to help with your sleep.  No change in dose.    Please limit use of all acute analgesics to less than 15 days/month to address medication overuse headaches.    Please keep the headache diary and bring it to the next follow-up visit or upload via My Chart.    Please come back at any time if you have additional episodes of vertigo.    Next follow-up appointment is in the next 3 months or earlier if needed.    Please do not hesitate to call me with any questions or concerns.    Thanks.

## 2021-02-25 NOTE — LETTER
2/25/2021         RE: Lorie Rosales  [address]  [place] [address]        Dear Colleague,    Thank you for referring your patient, Lorie Rosales, to the [facility] [place]. Please see a copy of my visit note below.    ESTABLISHED PATIENT NEUROLOGY NOTE    DATE OF VISIT: 2/25/2021  CLINIC LOCATION: [facility]  MRN: [id number]  PATIENT NAME: Lorie Rosales  YOB: 1980    PCP: Geetha June MD    REASON FOR VISIT:   Chief Complaint   Patient presents with     Consult     Tension headaches      SUBJECTIVE:                                                    HISTORY OF PRESENT ILLNESS: Patient is here to follow up regarding headache disorder and vertigo.  Last visit was on 11/5/2020.  At that time I ordered brain MRI to evaluate for intracranial causes of vertigo and advised her to take riboflavin every day for headache prevention.  Please refer to my initial/other prior notes for further information.    Since the last visit, the patient reports that her headaches are stable.  We reviewed her headache diary together.  In November she had 3 headaches with one of them 8/10 and the rest of them 4-5/10, in December 2 headaches of moderate intensity, in January she had 2 and in February 3 headaches of moderate intensity.    For headache prevention, she is on riboflavin 400 mg evening even though I told her to take it in the morning.  She forgot it, and occasionally feels that her sleep is affected.  For acute therapy she takes naproxen, but feels that it loses its effectiveness.  For that reason she alternates it with ibuprofen and Tylenol.  She takes analgesics 2 to 4 days/week.  No significant side effects or interval development of new focal neurological symptoms.    She denies any additional episodes of vertigo.  She decided not to see ENT specialist for now.  Brain MRI with and without contrast from 12/3/2020 demonstrated no abnormal  "signal or enhancement along the course of his cranial nerves bilaterally or other abnormal findings.  These images were personally reviewed and independently interpreted.    On review of systems, patient endorses no additional active complaints. Medications, allergies, family and social history were also reviewed. There are no changes reported by patient.  REVIEW OF SYSTEMS:                                                    10-system review was completed. Pertinent positives are included in HPI. The remainder of ROS is negative.  EXAM:                                                    Physical Exam:   Vitals: /80   Pulse 74   Ht 5' 3.5\" (1.613 m)   Wt 109 lb 12.8 oz (49.8 kg)   SpO2 98%   BMI 19.15 kg/m      General: pt is in NAD, cooperative.  Skin: normal turgor, moist mucous membranes, no lesions/rashes noticed.  HEENT: ATNC, white sclera, normal conjunctiva.  Respiratory: Symmetric lung excursion, no accessory respiratory muscle use.  Abdomen: Non distended.  Neurological: awake, cooperative, follows commands, no exam changes compared to previous visit.  ASSESSMENT AND PLAN:                                                    Assessment: 40-year-old female patient with multifactorial (tension and medication overuse) headache disorder and vertigo presents for follow-up.  Her headaches are stable.  Vertigo did not recur.  Brain MRI was unrevealing.  In the past, we discussed use of verapamil and trial of Imitrex if ENT evaluation is unrevealing.    She reports that her headaches are less frequent on riboflavin, but she takes it in the evening and it affects her night sleep.  I advised her to switch her administration time to the morning to avoid this side effect.  In the past, we discussed additional preventive options of Taiwo-relief, Effexor, nortriptyline, amitriptyline, or gabapentin if riboflavin is not effective.  Naproxen seems to work well for acute therapy, but in my opinion she is overusing it " and it leads in turn to perceived loss of efficacy requiring use of Tylenol and ibuprofen.  We discussed the mechanism of rebound headaches and strategies to improve it.    Diagnoses:    ICD-10-CM    1. Tension headache  G44.209    2. Medication overuse headache  G44.40    3. Vertigo  R42      Plan: At today's visit we thoroughly discussed current symptoms, brain MRI results (normal), available treatment options, and the plan.  I am pleased to hear that patient's headaches remain stable.    We decided to switch riboflavin administration to morning to prevent insomnia.    I advised the patient to limit use of all acute analgesics to less than 15 days/month to address medication overuse headaches.    I instructed her to keep the headache diary and bring it to the next follow-up visit or upload via My Chart.    I recommended to come back at any time if she has additional episodes of vertigo.    Next follow-up appointment is in the next 3 months or earlier if needed.    Total Time: 32 minutes spent on the date of the encounter doing chart review, history and exam, documentation and further activities as noted above.    Connor Valladares MD  [facility] Neurology  (Chart documentation was completed in part with Dragon voice-recognition software. Even though reviewed, some grammatical, spelling, and word errors may remain.)      Again, thank you for allowing me to participate in the care of your patient.        Sincerely,        Connor Valladares MD

## 2021-03-04 ENCOUNTER — THERAPY VISIT (OUTPATIENT)
Dept: PHYSICAL THERAPY | Facility: CLINIC | Age: 41
End: 2021-03-04
Payer: COMMERCIAL

## 2021-03-04 DIAGNOSIS — M41.9 SCOLIOSIS: ICD-10-CM

## 2021-03-04 PROCEDURE — 97140 MANUAL THERAPY 1/> REGIONS: CPT | Mod: GP | Performed by: PHYSICAL THERAPIST

## 2021-03-04 PROCEDURE — 97112 NEUROMUSCULAR REEDUCATION: CPT | Mod: GP | Performed by: PHYSICAL THERAPIST

## 2021-03-04 PROCEDURE — 97110 THERAPEUTIC EXERCISES: CPT | Mod: GP | Performed by: PHYSICAL THERAPIST

## 2021-03-26 ENCOUNTER — THERAPY VISIT (OUTPATIENT)
Dept: PHYSICAL THERAPY | Facility: CLINIC | Age: 41
End: 2021-03-26
Payer: COMMERCIAL

## 2021-03-26 DIAGNOSIS — M41.9 SCOLIOSIS: ICD-10-CM

## 2021-03-26 PROCEDURE — 97110 THERAPEUTIC EXERCISES: CPT | Mod: GP | Performed by: PHYSICAL THERAPIST

## 2021-03-26 PROCEDURE — 97112 NEUROMUSCULAR REEDUCATION: CPT | Mod: GP | Performed by: PHYSICAL THERAPIST

## 2021-04-01 DIAGNOSIS — Z30.011 ENCOUNTER FOR INITIAL PRESCRIPTION OF CONTRACEPTIVE PILLS: ICD-10-CM

## 2021-04-05 RX ORDER — LEVONORGESTREL AND ETHINYL ESTRADIOL 0.1-0.02MG
KIT ORAL
Qty: 84 TABLET | Refills: 0 | Status: SHIPPED | OUTPATIENT
Start: 2021-04-05 | End: 2021-04-28

## 2021-04-05 NOTE — TELEPHONE ENCOUNTER
---Prescription approved per [facility] Refill Protocol.       Justina Samuel RN BSN     [facility]

## 2021-04-28 DIAGNOSIS — Z30.011 ENCOUNTER FOR INITIAL PRESCRIPTION OF CONTRACEPTIVE PILLS: ICD-10-CM

## 2021-04-28 RX ORDER — LEVONORGESTREL AND ETHINYL ESTRADIOL 0.1-0.02MG
KIT ORAL
Qty: 84 TABLET | Refills: 1 | Status: SHIPPED | OUTPATIENT
Start: 2021-04-28 | End: 2021-11-03

## 2021-06-08 PROBLEM — M41.9 SCOLIOSIS: Status: RESOLVED | Noted: 2021-02-12 | Resolved: 2021-06-08

## 2021-06-08 NOTE — PROGRESS NOTES
Discharge Note    Progress reporting period is from initial evaluation date (please see noted date below) to Mar 26, 2021.      Lorie failed to follow up and current status is unknown.  Please see information below for last relevant information on current status.  Patient seen for 5 visits.    SUBJECTIVE  Subjective changes noted by patient:  Lorie can sit for 2 hours at a time with a lumbar roll. She also got a theracane and that has helped with upper back pain. She feels like her symptoms are unchanged but she now can get them to calm down when the come on a little faster  .  Current pain level is (2/10 in neck and back).     Previous pain level was  7/10.   Changes in function:  Yes (See Goal flowsheet attached for changes in current functional level)  Adverse reaction to treatment or activity: None    OBJECTIVE  Changes noted in objective findings: JOSÉ 9 today     ASSESSMENT/PLAN  Diagnosis: Scoliosos; L LBP   Updated problem list and treatment plan:   Pain - HEP  Decreased ROM/flexibility - HEP  Decreased function - HEP  Decreased strength - HEP  STG/LTGs have been met or progress has been made towards goals:  Yes, please see goal flowsheet for most current information  Assessment of Progress: current status is unknown.    Last current status: Pt is progressing as expected   Self Management Plans:  HEP  I have re-evaluated this patient and find that the nature, scope, duration and intensity of the therapy is appropriate for the medical condition of the patient.  Lorie continues to require the following intervention to meet STG and LTG's:  HEP.    Recommendations:  Discharge with current home program.  Patient to follow up with MD as needed.    Please refer to the daily flowsheet for treatment today, total treatment time and time spent performing 1:1 timed codes.

## 2021-07-22 ENCOUNTER — OFFICE VISIT (OUTPATIENT)
Dept: FAMILY MEDICINE | Facility: CLINIC | Age: 41
End: 2021-07-22
Payer: COMMERCIAL

## 2021-07-22 VITALS
HEART RATE: 69 BPM | SYSTOLIC BLOOD PRESSURE: 103 MMHG | DIASTOLIC BLOOD PRESSURE: 70 MMHG | WEIGHT: 112 LBS | BODY MASS INDEX: 19.53 KG/M2 | OXYGEN SATURATION: 99 % | TEMPERATURE: 97.1 F

## 2021-07-22 DIAGNOSIS — R06.00 DYSPNEA, UNSPECIFIED TYPE: Primary | ICD-10-CM

## 2021-07-22 LAB
ANION GAP SERPL CALCULATED.3IONS-SCNC: 4 MMOL/L (ref 3–14)
BUN SERPL-MCNC: 13 MG/DL (ref 7–30)
CALCIUM SERPL-MCNC: 9.3 MG/DL (ref 8.5–10.1)
CHLORIDE BLD-SCNC: 107 MMOL/L (ref 94–109)
CO2 SERPL-SCNC: 28 MMOL/L (ref 20–32)
CREAT SERPL-MCNC: 0.66 MG/DL (ref 0.52–1.04)
ERYTHROCYTE [DISTWIDTH] IN BLOOD BY AUTOMATED COUNT: 11.7 % (ref 10–15)
GFR SERPL CREATININE-BSD FRML MDRD: >90 ML/MIN/1.73M2
GLUCOSE BLD-MCNC: 85 MG/DL (ref 70–99)
HCT VFR BLD AUTO: 42.8 % (ref 35–47)
HGB BLD-MCNC: 14.5 G/DL (ref 11.7–15.7)
MCH RBC QN AUTO: 30.5 PG (ref 26.5–33)
MCHC RBC AUTO-ENTMCNC: 33.9 G/DL (ref 31.5–36.5)
MCV RBC AUTO: 90 FL (ref 78–100)
NT-PROBNP SERPL-MCNC: 73 PG/ML (ref 0–125)
PLATELET # BLD AUTO: 314 10E3/UL (ref 150–450)
POTASSIUM BLD-SCNC: 3.5 MMOL/L (ref 3.4–5.3)
RBC # BLD AUTO: 4.76 10E6/UL (ref 3.8–5.2)
SODIUM SERPL-SCNC: 139 MMOL/L (ref 133–144)
WBC # BLD AUTO: 6.5 10E3/UL (ref 4–11)

## 2021-07-22 PROCEDURE — 99214 OFFICE O/P EST MOD 30 MIN: CPT | Performed by: FAMILY MEDICINE

## 2021-07-22 PROCEDURE — 85027 COMPLETE CBC AUTOMATED: CPT | Performed by: FAMILY MEDICINE

## 2021-07-22 PROCEDURE — 36415 COLL VENOUS BLD VENIPUNCTURE: CPT | Performed by: FAMILY MEDICINE

## 2021-07-22 PROCEDURE — 83880 ASSAY OF NATRIURETIC PEPTIDE: CPT | Performed by: FAMILY MEDICINE

## 2021-07-22 PROCEDURE — 80048 BASIC METABOLIC PNL TOTAL CA: CPT | Performed by: FAMILY MEDICINE

## 2021-07-22 NOTE — PROGRESS NOTES
Assessment & Plan     Dyspnea, unspecified type  - H/o dyspnea with previous pulmonary work up which was negative. Pt was empirically treated with Advair which helped with symptoms. She notes that albuterol doesn't help with symptoms. We discussed repeat PFTS, pulmonary evaluation along with ruling out anemia vs CHF vs GREGORY. Ordered below for further evaluation; tx as indicated. I did offer repeat PFTs which pt declined. I went ahead and refilled Advair. We also discussed continuing to monitor symptoms to further evaluate if anxiety might be contributing to symptoms.   - BNP-N terminal pro; Future  - Basic metabolic panel  (Ca, Cl, CO2, Creat, Gluc, K, Na, BUN); Future  - CBC with platelets; Future  - fluticasone-salmeterol (ADVAIR) 100-50 MCG/DOSE inhaler; Inhale 1 puff into the lungs every 12 hours  Dispense: 1 each; Refill: 0  - Adult Pulmonary Medicine Referral; Future  - BNP-N terminal pro  - Basic metabolic panel  (Ca, Cl, CO2, Creat, Gluc, K, Na, BUN)  - CBC with platelets        Brock Booker MD  [facility]    Dannie Melo is a 40 year old who presents for the following health issues     HPI     On and off shortness of breath since she was 12 years old.   She has to take a deep breath to take a breath.   She feels that since May 2021 - symptoms are worse.   She feels that breath is more labored.   She has to have an inhaler before which worked. She used albuterol which made her lightheaded.   No seasonal allergies- no sneezing. She previously had allergies to dust mites.   Four years ago she had pulmonary testing. She has never been diagnosed with asthma but was on medication.  She has taken claritin D for 2 days, unsure if it helped.    Unsure of anxiety    Review of Systems         Objective    There were no vitals taken for this visit.  There is no height or weight on file to calculate BMI.  Physical Exam   GENERAL: healthy, alert and no distress  RESP: lungs  clear to auscultation - no rales, rhonchi or wheezes  CV: regular rate and rhythm, normal S1 S2

## 2021-07-23 ENCOUNTER — TELEPHONE (OUTPATIENT)
Dept: PULMONOLOGY | Facility: CLINIC | Age: 41
End: 2021-07-23

## 2021-07-23 DIAGNOSIS — R06.00 DYSPNEA: Primary | ICD-10-CM

## 2021-07-23 NOTE — TELEPHONE ENCOUNTER
Spoke with patient about scheduling chest xray prior to visit with Dr. Hurtado in October. Pt was currently in the middle of something and could not schedule at the time. Pt stated she will call me back sometime next week.

## 2021-07-27 ENCOUNTER — TELEPHONE (OUTPATIENT)
Dept: PULMONOLOGY | Facility: CLINIC | Age: 41
End: 2021-07-27

## 2021-07-27 NOTE — TELEPHONE ENCOUNTER
Spoke with patient about scheduling chest xray prior to visit with Dr. Dunn in October. Details confirmed.

## 2021-07-28 NOTE — TELEPHONE ENCOUNTER
RECORDS RECEIVED FROM: Dyspnea   DATE RECEIVED: 10.26.21   NOTES STATUS DETAILS   OFFICE NOTE from referring provider Internal 7.22.21 EMILIANA Booker [facility]   OFFICE NOTE from other specialist N/A    DISCHARGE SUMMARY from hospital N/A    DISCHARGE REPORT from the ER N/A    MEDICATION LIST Internal    IMAGING  (NEED IMAGES AND REPORTS)     CT SCAN N/A    CHEST XRAY (CXR) N/A    TESTS     PULMONARY FUNCTION TESTING (PFT) N/A

## 2021-10-10 ENCOUNTER — HEALTH MAINTENANCE LETTER (OUTPATIENT)
Age: 41
End: 2021-10-10

## 2021-10-25 NOTE — TELEPHONE ENCOUNTER
RECORDS RECEIVED FROM: Dyspnea    DATE RECEIVED: 10.26.21    NOTES STATUS DETAILS   OFFICE NOTE from referring provider Internal 7.22.21 EMILIANA Booker [facility]   OFFICE NOTE from other specialist N/A     DISCHARGE SUMMARY from hospital N/A     DISCHARGE REPORT from the ER N/A     MEDICATION LIST Internal     IMAGING  (NEED IMAGES AND REPORTS)       CT SCAN na     CHEST XRAY (CXR) Internal    scheduled 1.19.22    TESTS      PULMONARY FUNCTION TESTING (PFT) Internal Scheduled 1.21.22

## 2021-10-26 ENCOUNTER — PRE VISIT (OUTPATIENT)
Dept: PULMONOLOGY | Facility: CLINIC | Age: 41
End: 2021-10-26

## 2021-11-02 DIAGNOSIS — Z30.011 ENCOUNTER FOR INITIAL PRESCRIPTION OF CONTRACEPTIVE PILLS: ICD-10-CM

## 2021-11-03 RX ORDER — LEVONORGESTREL AND ETHINYL ESTRADIOL 0.1-0.02MG
KIT ORAL
Qty: 84 TABLET | Refills: 0 | Status: SHIPPED | OUTPATIENT
Start: 2021-11-03 | End: 2022-01-21

## 2021-11-03 NOTE — TELEPHONE ENCOUNTER
Contraceptives Protocol Icoana7511/02/2021 09:24 AM   Patient is not a current smoker if age is 35 or older Protocol Details    Recent (12 mo) or future (30 days) visit within the authorizing provider's specialty     Medication is active on med list     No active pregnancy on record     No positive pregnancy test in past 12 months

## 2021-11-24 ENCOUNTER — IMMUNIZATION (OUTPATIENT)
Dept: NURSING | Facility: CLINIC | Age: 41
End: 2021-11-24
Payer: COMMERCIAL

## 2021-11-24 PROCEDURE — 0004A PR COVID VAC PFIZER DIL RECON 30 MCG/0.3 ML IM: CPT

## 2021-11-24 PROCEDURE — 91300 PR COVID VAC PFIZER DIL RECON 30 MCG/0.3 ML IM: CPT

## 2022-01-19 ENCOUNTER — ANCILLARY PROCEDURE (OUTPATIENT)
Dept: GENERAL RADIOLOGY | Facility: CLINIC | Age: 42
End: 2022-01-19
Attending: INTERNAL MEDICINE
Payer: COMMERCIAL

## 2022-01-19 DIAGNOSIS — R06.00 DYSPNEA: ICD-10-CM

## 2022-01-19 PROCEDURE — 71046 X-RAY EXAM CHEST 2 VIEWS: CPT | Mod: GC | Performed by: RADIOLOGY

## 2022-01-20 ENCOUNTER — TELEPHONE (OUTPATIENT)
Dept: PULMONOLOGY | Facility: CLINIC | Age: 42
End: 2022-01-20
Payer: COMMERCIAL

## 2022-01-20 NOTE — TELEPHONE ENCOUNTER
Called pt Guillermo Nathan informing cancellation of clinic appointment with . 1/21/22 I gave my call back number for rescheduling and sent a My Chart message with same details.

## 2022-01-21 ENCOUNTER — TELEPHONE (OUTPATIENT)
Dept: PULMONOLOGY | Facility: CLINIC | Age: 42
End: 2022-01-21

## 2022-01-21 ENCOUNTER — PRE VISIT (OUTPATIENT)
Dept: PULMONOLOGY | Facility: CLINIC | Age: 42
End: 2022-01-21

## 2022-01-21 NOTE — TELEPHONE ENCOUNTER
Pt returned my phone call about rescheduling visit with Dr. Mir on 1/21, in addition to reschduling PFT prior. Did inform pt that the provider is no longer available that day. Pt will now see Dr. Enrique on 2/3, in addition to a full PFT prior. Details confirmed.

## 2022-01-21 NOTE — TELEPHONE ENCOUNTER
RECORDS RECEIVED FROM: Internal   DATE RECEIVED: 2.3.22   NOTES STATUS DETAILS   OFFICE NOTE from referring provider Internal 7.22.21 THOMAS Booker [facility]   OFFICE NOTE from other specialist N/A    DISCHARGE SUMMARY from hospital N/A    DISCHARGE REPORT from the ER N/A    MEDICATION LIST Internal    IMAGING  (NEED IMAGES AND REPORTS)     CT SCAN N/A    CHEST XRAY (CXR) Internal 1.19.22   TESTS     PULMONARY FUNCTION TESTING (PFT) Internal

## 2022-02-03 ENCOUNTER — PRE VISIT (OUTPATIENT)
Dept: PULMONOLOGY | Facility: CLINIC | Age: 42
End: 2022-02-03
Payer: COMMERCIAL

## 2022-04-28 NOTE — TELEPHONE ENCOUNTER
Action 4.28.22 sv    Action Taken Message sent to nurse pool to place PFT and CXR order         RECORDS RECEIVED FROM: internal    DATE RECEIVED: 7/27/22    NOTES STATUS DETAILS   OFFICE NOTE from referring provider internal  Brock Booker MD   OFFICE NOTE from other specialist     DISCHARGE SUMMARY from hospital     DISCHARGE REPORT from the ER     MEDICATION LIST internal     IMAGING  (NEED IMAGES AND REPORTS)     CT SCAN     CHEST XRAY (CXR) internal  1.19.22    TESTS     PULMONARY FUNCTION TESTING (PFT) internal  Scheduled 7/27/22

## 2022-06-23 ENCOUNTER — TELEPHONE (OUTPATIENT)
Dept: PULMONOLOGY | Facility: CLINIC | Age: 42
End: 2022-06-23

## 2022-06-23 NOTE — TELEPHONE ENCOUNTER
Talked with patient and rescheduled appt on 7/27/22 with Dr. Temple to 8/4/22 with Dr. Roldan. Patient agreeable to appt with Dr. Roldan on 8/4/22 and patient also agreeable to CXR and FPFT on 8/4/22 prior to appt with Dr. Roldan. Patient declined appt on 8/3/22. Details of appts confirmed with patient. Patient declined a mailed itinerary.

## 2022-06-24 DIAGNOSIS — R06.00 DYSPNEA, UNSPECIFIED TYPE: Primary | ICD-10-CM

## 2022-06-24 NOTE — TELEPHONE ENCOUNTER
RECORDS RECEIVED FROM: internal     DATE RECEIVED: 7/27/22     NOTES STATUS DETAILS   OFFICE NOTE from referring provider internal  Brock Booker MD   OFFICE NOTE from other specialist       DISCHARGE SUMMARY from hospital       DISCHARGE REPORT from the ER       MEDICATION LIST internal      IMAGING  (NEED IMAGES AND REPORTS)       CT SCAN       CHEST XRAY (CXR) internal  Scheduled 8/4/22   TESTS       PULMONARY FUNCTION TESTING (PFT) internal  Scheduled 8/4/22

## 2022-07-06 ENCOUNTER — NURSE TRIAGE (OUTPATIENT)
Dept: URGENT CARE | Facility: URGENT CARE | Age: 42
End: 2022-07-06

## 2022-07-06 NOTE — TELEPHONE ENCOUNTER
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  Reports this instance began with L sided tinnitus on Monday 6/27 and developed into dizziness on 6/29.  This occurs when she lies down, or leans forward or backwards.  Resolves within \"a few minutes,\" generally \"feels ok\" when upright.  Able to work and complete ADLs.    Reports a hx of vertigo, but previous instances were \"opposite:\" her dizziness was when standing.  Denies ear pain, vision change, SOB, numbness or tingling in extremities.  Reports occasional headaches but states this is her baseline.  Recalls no injury.    Only same days in clinic for remainder of week.  Pt returns to [place] tomorrow and was advised she can go to  if she would like to be seen this week and pt agrees.     ALBERTO Knox RN  [facility]       Reason for Disposition    Dizziness not present now, but is a chronic symptom (recurrent or ongoing AND lasting > 4 weeks)    Additional Information    Negative: Shock suspected (e.g., cold/pale/clammy skin, too weak to stand, low BP, rapid pulse)    Negative: Difficult to awaken or acting confused (e.g., disoriented, slurred speech)    Negative: Fainted, and still feels dizzy afterwards    Negative: Severe difficulty breathing (e.g., struggling for each breath, speaks in single words)    Negative: Overdose (accidental or intentional) of medications    Negative: New neurologic deficit that is present now: * Weakness of the face, arm, or leg on one side of the body * Numbness of the face, arm, or leg on one side of the body * Loss of speech or garbled speech    Negative: Heart beating < 50 beats per minute OR > 140 beats per minute    Negative: Sounds like a life-threatening emergency to the triager    Negative: Chest pain    Negative: Rectal bleeding, bloody stool, or tarry-black stool    Negative: Vomiting is the main symptom    Negative: Diarrhea is the main symptom    Negative: Headache is the main " symptom    Negative: Heat exhaustion suspected (i.e., dehydration from heat exposure)    Negative: Patient states that he/she is having an anxiety/panic attack    Negative: SEVERE dizziness (e.g., unable to stand, requires support to walk, feels like passing out now)    Negative: SEVERE headache or neck pain    Negative: Spinning or tilting sensation (vertigo) present now and one or more stroke risk factors (i.e., hypertension, diabetes, prior stroke/TIA, heart attack, age over 60) (Exception: prior physician evaluation for this AND no different/worse than usual)    Negative: Loss of vision or double vision    Negative: Extra heart beats OR irregular heart beating (i.e., 'palpitations')    Negative: Difficulty breathing    Negative: Drinking very little and has signs of dehydration (e.g., no urine > 12 hours, very dry mouth, very lightheaded)    Negative: Follows bleeding (e.g., stomach, rectum, vagina) (Exception: became dizzy from sight of small amount blood)    Negative: Patient sounds very sick or weak to the triager    Negative: Lightheadedness (dizziness) present now, after 2 hours of rest and fluids    Negative: Spinning or tilting sensation (vertigo) present now    Negative: Fever > 103 F (39.4 C)    Negative: Fever > 100.0 F (37.8 C) and has diabetes mellitus or a weak immune system (e.g., HIV positive, cancer chemotherapy, organ transplant, splenectomy, chronic steroids)    Negative: Vomiting occurs with dizziness    Negative: Patient wants to be seen    Negative: Taking a medicine that could cause dizziness (e.g., blood pressure medications, diuretics)    Negative: Diabetes    Protocols used: DIZZINESS-A-OH

## 2022-07-27 ENCOUNTER — PRE VISIT (OUTPATIENT)
Dept: PULMONOLOGY | Facility: CLINIC | Age: 42
End: 2022-07-27
Payer: COMMERCIAL

## 2022-08-04 ENCOUNTER — LAB (OUTPATIENT)
Dept: LAB | Facility: CLINIC | Age: 42
End: 2022-08-04

## 2022-08-04 ENCOUNTER — PRE VISIT (OUTPATIENT)
Dept: PULMONOLOGY | Facility: CLINIC | Age: 42
End: 2022-08-04

## 2022-08-04 ENCOUNTER — OFFICE VISIT (OUTPATIENT)
Dept: PULMONOLOGY | Facility: CLINIC | Age: 42
End: 2022-08-04
Attending: INTERNAL MEDICINE
Payer: COMMERCIAL

## 2022-08-04 ENCOUNTER — ANCILLARY PROCEDURE (OUTPATIENT)
Dept: GENERAL RADIOLOGY | Facility: CLINIC | Age: 42
End: 2022-08-04
Payer: COMMERCIAL

## 2022-08-04 VITALS
SYSTOLIC BLOOD PRESSURE: 124 MMHG | BODY MASS INDEX: 19.53 KG/M2 | WEIGHT: 112 LBS | HEART RATE: 67 BPM | DIASTOLIC BLOOD PRESSURE: 79 MMHG | OXYGEN SATURATION: 98 %

## 2022-08-04 DIAGNOSIS — R06.00 DYSPNEA, UNSPECIFIED TYPE: ICD-10-CM

## 2022-08-04 DIAGNOSIS — D72.10 EOSINOPHILIA, UNSPECIFIED TYPE: Primary | ICD-10-CM

## 2022-08-04 DIAGNOSIS — Z23 IMMUNIZATION DUE: ICD-10-CM

## 2022-08-04 DIAGNOSIS — J45.40 MODERATE PERSISTENT ASTHMA, UNSPECIFIED WHETHER COMPLICATED: ICD-10-CM

## 2022-08-04 DIAGNOSIS — J45.40 MODERATE PERSISTENT ASTHMA, UNSPECIFIED WHETHER COMPLICATED: Primary | ICD-10-CM

## 2022-08-04 LAB
BASOPHILS # BLD AUTO: 0.1 10E3/UL (ref 0–0.2)
BASOPHILS NFR BLD AUTO: 1 %
EOSINOPHIL # BLD AUTO: 1 10E3/UL (ref 0–0.7)
EOSINOPHIL NFR BLD AUTO: 14 %
ERYTHROCYTE [DISTWIDTH] IN BLOOD BY AUTOMATED COUNT: 11.8 % (ref 10–15)
HCT VFR BLD AUTO: 39.2 % (ref 35–47)
HGB BLD-MCNC: 13.4 G/DL (ref 11.7–15.7)
IMM GRANULOCYTES # BLD: 0 10E3/UL
IMM GRANULOCYTES NFR BLD: 0 %
LYMPHOCYTES # BLD AUTO: 3.1 10E3/UL (ref 0.8–5.3)
LYMPHOCYTES NFR BLD AUTO: 41 %
MCH RBC QN AUTO: 31.4 PG (ref 26.5–33)
MCHC RBC AUTO-ENTMCNC: 34.2 G/DL (ref 31.5–36.5)
MCV RBC AUTO: 92 FL (ref 78–100)
MONOCYTES # BLD AUTO: 0.4 10E3/UL (ref 0–1.3)
MONOCYTES NFR BLD AUTO: 6 %
NEUTROPHILS # BLD AUTO: 2.8 10E3/UL (ref 1.6–8.3)
NEUTROPHILS NFR BLD AUTO: 38 %
NRBC # BLD AUTO: 0 10E3/UL
NRBC BLD AUTO-RTO: 0 /100
PLATELET # BLD AUTO: 274 10E3/UL (ref 150–450)
RBC # BLD AUTO: 4.27 10E6/UL (ref 3.8–5.2)
WBC # BLD AUTO: 7.3 10E3/UL (ref 4–11)

## 2022-08-04 PROCEDURE — G0009 ADMIN PNEUMOCOCCAL VACCINE: HCPCS | Performed by: INTERNAL MEDICINE

## 2022-08-04 PROCEDURE — 82785 ASSAY OF IGE: CPT | Performed by: PATHOLOGY

## 2022-08-04 PROCEDURE — 71046 X-RAY EXAM CHEST 2 VIEWS: CPT | Mod: GC | Performed by: RADIOLOGY

## 2022-08-04 PROCEDURE — 94375 RESPIRATORY FLOW VOLUME LOOP: CPT | Performed by: INTERNAL MEDICINE

## 2022-08-04 PROCEDURE — 36415 COLL VENOUS BLD VENIPUNCTURE: CPT | Performed by: PATHOLOGY

## 2022-08-04 PROCEDURE — 99204 OFFICE O/P NEW MOD 45 MIN: CPT | Mod: 25 | Performed by: INTERNAL MEDICINE

## 2022-08-04 PROCEDURE — 85025 COMPLETE CBC W/AUTO DIFF WBC: CPT | Performed by: PATHOLOGY

## 2022-08-04 PROCEDURE — 250N000011 HC RX IP 250 OP 636: Performed by: INTERNAL MEDICINE

## 2022-08-04 PROCEDURE — G0463 HOSPITAL OUTPT CLINIC VISIT: HCPCS | Mod: 25

## 2022-08-04 PROCEDURE — 90732 PPSV23 VACC 2 YRS+ SUBQ/IM: CPT | Performed by: INTERNAL MEDICINE

## 2022-08-04 PROCEDURE — 99000 SPECIMEN HANDLING OFFICE-LAB: CPT | Performed by: PATHOLOGY

## 2022-08-04 PROCEDURE — 94729 DIFFUSING CAPACITY: CPT | Performed by: INTERNAL MEDICINE

## 2022-08-04 PROCEDURE — 94726 PLETHYSMOGRAPHY LUNG VOLUMES: CPT | Performed by: INTERNAL MEDICINE

## 2022-08-04 RX ORDER — LEVALBUTEROL TARTRATE 45 UG/1
2 AEROSOL, METERED ORAL EVERY 4 HOURS PRN
Qty: 15 G | Refills: 3 | Status: SHIPPED | OUTPATIENT
Start: 2022-08-04 | End: 2022-08-16

## 2022-08-04 RX ORDER — FLUTICASONE PROPIONATE AND SALMETEROL 250; 50 UG/1; UG/1
1 POWDER RESPIRATORY (INHALATION) 2 TIMES DAILY
Qty: 1 EACH | Refills: 11 | Status: SHIPPED | OUTPATIENT
Start: 2022-08-04 | End: 2023-02-02

## 2022-08-04 RX ADMIN — PNEUMOCOCCAL VACCINE POLYVALENT 0.5 ML
25; 25; 25; 25; 25; 25; 25; 25; 25; 25; 25; 25; 25; 25; 25; 25; 25; 25; 25; 25; 25; 25; 25 INJECTION, SOLUTION INTRAMUSCULAR; SUBCUTANEOUS at 16:07

## 2022-08-04 ASSESSMENT — ENCOUNTER SYMPTOMS
FEVER: 0
CHILLS: 0
TREMORS: 0
INSOMNIA: 0
HEMOPTYSIS: 0
FOCAL WEAKNESS: 0
SORE THROAT: 0
SPUTUM PRODUCTION: 0
SHORTNESS OF BREATH: 1
SPEECH CHANGE: 0
SINUS PAIN: 0
MEMORY LOSS: 0
WHEEZING: 0
NERVOUS/ANXIOUS: 0
COUGH: 0

## 2022-08-04 NOTE — PROGRESS NOTES
[facility]    Reason for Visit  Lorie Rosales is a 41 year old year old female who is being seen for New Patient (Dyspnea )    HPI  Patient is 41 years old female with past medical history of exercise-induced dyspnea since she was a teenager.  She has never received an asthma diagnosis but Advair 100 was tried with 40% improvement in symptoms.  She had spirometry back in [place] [several years ago] and was told that it was normal.  Her dyspnea is triggered by running and sometimes after talking she would feel she cannot take a deep breath in.  Her symptoms are more prominent in late spring and early summer. She denies subjective wheezing, cough, sputum production, nocturnal symptoms, nasal symptoms, sinus symptoms, or symptoms to suggest an allergic reaction.  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  There is no symptomatic variation with occupation.  She was prescribed also albuterol as needed and several years ago but does not take it due to dizziness.  She is a lifetime non-smoker.  No history of ER visits or urgent care visits for shortness of breath.    Review of Systems   Constitutional: Negative for chills and fever.   HENT: Negative for congestion, hearing loss, sinus pain and sore throat.    Respiratory: Positive for shortness of breath. Negative for cough, hemoptysis, sputum production and wheezing.    Neurological: Negative for tremors, speech change and focal weakness.   Psychiatric/Behavioral: Negative for memory loss. The patient is not nervous/anxious and does not have insomnia.         Current Outpatient Medications   Medication     FALMINA 0.1-20 MG-MCG tablet     fluticasone-salmeterol (ADVAIR) 100-50 MCG/DOSE inhaler     meclizine (ANTIVERT) 25 MG tablet     ondansetron (ZOFRAN-ODT) 8 MG ODT tab     No current facility-administered medications for this visit.     No Known Allergies  Past Medical History:   Diagnosis Date     Tension headache      Neuro consult     Vertigo        No past surgical history on file.    Social History     Socioeconomic History     Marital status:      Spouse name: Not on file     Number of children: Not on file     Years of education: Not on file     Highest education level: Not on file   Occupational History     Not on file   Tobacco Use     Smoking status: Never Smoker     Smokeless tobacco: Never Used   Substance and Sexual Activity     Alcohol use: No     Drug use: No     Sexual activity: Yes     Birth control/protection: Pill   Other Topics Concern     Parent/sibling w/ CABG, MI or angioplasty before 65F 55M? Not Asked   Social History Narrative     Not on file     Social Determinants of Health     Financial Resource Strain: Not on file   Food Insecurity: Not on file   Transportation Needs: Not on file   Physical Activity: Not on file   Stress: Not on file   Social Connections: Not on file   Intimate Partner Violence: Not on file   Housing Stability: Not on file       Family History   Problem Relation Age of Onset     Aneurysm Mother 49        brain         Vitals: /79 (BP Location: Right arm, Patient Position: Chair, Cuff Size: Adult Small)   Pulse 67   Wt 50.8 kg (112 lb)   SpO2 98%   BMI 19.53 kg/m      Exam:   GENERAL APPEARANCE: Well developed, well nourished, alert, and in no apparent distress.  LYMPHATICS: No significant cervical, or supraclavicular nodes.  RESP: good air flow throughout.  No crackles. No rhonchi. No wheezes.  CV: Normal S1, S2, regular rhythm, normal rate. No murmur.  No LE edema.   ABD: Soft, lax, nontender.  MS: extremities normal. No clubbing. No cyanosis.  SKIN: no rash on limited exam.  NEURO: Mentation intact, speech normal, normal gait and stance.  PSYCH: mentation appears normal. and affect normal/bright.    Results:  PFTs today 8/4/22      Chest imaging:  Reviewed CXR images from today and compared to 1/2022  Unchanged mild hyperinflation  Scoliosis with hardware        Assessment and plan:   1.  Exercise-induced asthma  Patient is 41 years old female with exertional dyspnea since being a teenager with constellation of historic clues, radiologic and PFT findings to suggest exercise-induced asthma.  She also has seasonal variation.  Although her spirometry shows no obstruction, she does have significant air trapping [].  Since this is likely exercise-induced asthma, obstruction would not show up on spirometry and less exercise PFTs are performed.  She does have significant symptomatic response to Advair but still short of breath in spring and summer and with exercise.  Plan:  CBC with differential rule out eosinophilia  Check IgE level today  Exhaled nitric oxide with next visit  Increase Advair to 250, 1 puff twice daily  Levalbuterol as needed and prior to exercise [did not tolerate albuterol in the past]  PCV 23 vaccine today         Return to clinic in 6 months

## 2022-08-04 NOTE — NURSING NOTE
Chief Complaint   Patient presents with     New Patient     Dyspnea      Vitals were taken and medications were reconciled.     Trudy Landeros RMA  3:26 PM

## 2022-08-04 NOTE — PATIENT INSTRUCTIONS
For clinical questions, please call our nursing line [phone]    For scheduling, please call [phone]

## 2022-08-05 ENCOUNTER — TELEPHONE (OUTPATIENT)
Dept: PULMONOLOGY | Facility: CLINIC | Age: 42
End: 2022-08-05

## 2022-08-05 LAB
DLCOUNC-%PRED-PRE: 102 %
DLCOUNC-PRE: 21.9 ML/MIN/MMHG
DLCOUNC-PRED: 21.35 ML/MIN/MMHG
ERV-%PRED-PRE: 58 %
ERV-PRE: 0.75 L
ERV-PRED: 1.29 L
EXPTIME-PRE: 5.1 SEC
FEF2575-%PRED-PRE: 130 %
FEF2575-PRE: 3.8 L/SEC
FEF2575-PRED: 2.9 L/SEC
FEFMAX-%PRED-PRE: 89 %
FEFMAX-PRE: 6.13 L/SEC
FEFMAX-PRED: 6.86 L/SEC
FEV1-%PRED-PRE: 107 %
FEV1-PRE: 2.92 L
FEV1FEV6-PRE: 90 %
FEV1FEV6-PRED: 84 %
FEV1FVC-PRE: 90 %
FEV1FVC-PRED: 83 %
FEV1SVC-PRE: 97 %
FEV1SVC-PRED: 76 %
FIFMAX-PRE: 3.74 L/SEC
FRCPLETH-%PRED-PRE: 113 %
FRCPLETH-PRE: 3.02 L
FRCPLETH-PRED: 2.65 L
FVC-%PRED-PRE: 98 %
FVC-PRE: 3.23 L
FVC-PRED: 3.29 L
IC-%PRED-PRE: 98 %
IC-PRE: 2.25 L
IC-PRED: 2.29 L
RVPLETH-%PRED-PRE: 142 %
RVPLETH-PRE: 2.26 L
RVPLETH-PRED: 1.59 L
TLCPLETH-%PRED-PRE: 108 %
TLCPLETH-PRE: 5.27 L
TLCPLETH-PRED: 4.86 L
VA-%PRED-PRE: 88 %
VA-PRE: 4.29 L
VC-%PRED-PRE: 83 %
VC-PRE: 3.01 L
VC-PRED: 3.59 L

## 2022-08-05 NOTE — TELEPHONE ENCOUNTER
Left message with patient to please return call to clinic with number to obtain advisement provided by Dr Olmedo. He is referring patient to allergy clinic for elevated allergy cells(eosinophils). Will give her that message when she returns call.

## 2022-08-05 NOTE — TELEPHONE ENCOUNTER
Left message with patient to return call to clinic to obtain advisement from Dr Olmedo he sent in result notes.

## 2022-08-06 LAB — IGE SERPL-ACNC: 524 KU/L (ref 0–114)

## 2022-08-08 ENCOUNTER — TELEPHONE (OUTPATIENT)
Dept: PULMONOLOGY | Facility: CLINIC | Age: 42
End: 2022-08-08

## 2022-08-08 NOTE — TELEPHONE ENCOUNTER
"Called and spoke with patient to refer the following information.    \"----- Message from Margy Roldan MD sent at 8/8/2022 10:56 AM CDT -----  Can you please call the patient that her IgE is significantly elevated, which is pointing towards a strong allergic trigger in her environment. If she is still unable to identify the trigger, we can refer her to Allergy clinic for testing. She does also qualify to receive Xolari injections, which will be our next step if Advair and Levalbeuterol (prescribed last visit) do not provider her with relief.\"    Patient is already set up for an allergy appointment. She verbalized understanding and all questions were answered.    "

## 2022-08-08 NOTE — TELEPHONE ENCOUNTER
Patient has not returned clinic call regarding allergy referral, however, patient is scheduled for the appointment already.

## 2022-08-09 ENCOUNTER — OFFICE VISIT (OUTPATIENT)
Dept: ALLERGY | Facility: CLINIC | Age: 42
End: 2022-08-09
Attending: INTERNAL MEDICINE
Payer: COMMERCIAL

## 2022-08-09 VITALS
BODY MASS INDEX: 19.34 KG/M2 | HEART RATE: 71 BPM | OXYGEN SATURATION: 100 % | DIASTOLIC BLOOD PRESSURE: 65 MMHG | WEIGHT: 110.9 LBS | SYSTOLIC BLOOD PRESSURE: 92 MMHG

## 2022-08-09 DIAGNOSIS — D72.10 EOSINOPHILIA, UNSPECIFIED TYPE: ICD-10-CM

## 2022-08-09 DIAGNOSIS — J45.40 ASTHMA, MODERATE PERSISTENT, POORLY-CONTROLLED: Primary | ICD-10-CM

## 2022-08-09 PROCEDURE — 99204 OFFICE O/P NEW MOD 45 MIN: CPT | Performed by: INTERNAL MEDICINE

## 2022-08-09 RX ORDER — PREDNISONE 20 MG/1
TABLET ORAL
Qty: 12 TABLET | Refills: 0 | Status: SHIPPED | OUTPATIENT
Start: 2022-08-09 | End: 2023-05-03

## 2022-08-09 ASSESSMENT — ENCOUNTER SYMPTOMS
HEADACHES: 0
JOINT SWELLING: 0
WHEEZING: 0
SINUS PRESSURE: 0
EYE DISCHARGE: 0
DIARRHEA: 0
ACTIVITY CHANGE: 0
EYE REDNESS: 0
SHORTNESS OF BREATH: 1
FEVER: 0
ARTHRALGIAS: 0
ADENOPATHY: 0
COUGH: 0
VOMITING: 0
CHEST TIGHTNESS: 0
RHINORRHEA: 0
EYE ITCHING: 0
CHILLS: 0
NAUSEA: 0
FACIAL SWELLING: 0
MYALGIAS: 0

## 2022-08-09 NOTE — LETTER
8/9/2022         RE: Lorie Rosales  [address] [place] [address]        Dear Colleague,    Thank you for referring your patient, Lorie Rosales, to the [facility] [place]. Please see a copy of my visit note below.    Lorie Rosales was seen in the Allergy Clinic at [facility].    Lorie Rosales is a 41 year old female being seen today at the request of Dr. Roldan in consultation for allergies.      She has a lifelong history of having shortness of breath that will persist for several months and then resolved on its own.  She describes a sensation that she is not able to get enough air in.  She does not have any complaints of sneezing, cough, wheezing, eye itching or stridor.  She describes heavy breathing with exercise.  There are times where she has no symptoms at all.    Recently lab work showed eosinophilia of 1000 and an IgE level elevated at 542.    Recent pulmonary function testing was normal for the FEV1 and FVC however the residual volume was elevated consistent with air trapping.  Residual volume was 142% predicted.    She is currently using Advair 100 mcg but has been prescribed a higher dose which she will switch to as soon as the lower dose is complete.  She is taking Advair 1 puff twice daily typically.  She is not sure if it is providing benefit.  She has not tried the Xopenex or levalbuterol prior to exercise but has recently picked this up.    Exhaled nitric oxide is planned at a future visit.    She was skin tested years ago in [place] which was positive to several allergens.  She does not have any classic allergy symptoms but does have shortness of breath which she feels changes seasonally potentially.      Past Medical History:   Diagnosis Date     Tension headache     Neuro consult     Vertigo      Family History   Problem Relation Age of Onset     Aneurysm Mother 49        brain     No past surgical history on file.    ENVIRONMENTAL  HISTORY:   Pets inside the house include None.  Do you smoke cigarettes or other recreational drugs? No There is/are 0 smokers living in the house. The house does not have a damp basement.     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 She lives with her spouse, son and daughter.      Review of Systems   Constitutional: Negative for activity change, chills and fever.   HENT: Negative for congestion, dental problem, ear pain, facial swelling, nosebleeds, postnasal drip, rhinorrhea, sinus pressure and sneezing.    Eyes: Negative for discharge, redness and itching.   Respiratory: Positive for shortness of breath. Negative for cough, chest tightness and wheezing.    Cardiovascular: Negative for chest pain.   Gastrointestinal: Negative for diarrhea, nausea and vomiting.   Musculoskeletal: Negative for arthralgias, joint swelling and myalgias.   Skin: Negative for rash.   Neurological: Negative for headaches.   Hematological: Negative for adenopathy.   Psychiatric/Behavioral: Negative for behavioral problems and self-injury.         Current Outpatient Medications:      FALMINA 0.1-20 MG-MCG tablet, TAKE ONE TABLET BY MOUTH ONCE DAILY, Disp: 84 tablet, Rfl: 3     fluticasone-salmeterol (ADVAIR) 250-50 MCG/ACT inhaler, Inhale 1 puff into the lungs 2 times daily, Disp: 1 each, Rfl: 11     levalbuterol (XOPENEX HFA) 45 MCG/ACT inhaler, Inhale 2 puffs into the lungs every 4 hours as needed for shortness of breath / dyspnea or wheezing (Prior to exercise) (Patient not taking: Reported on 8/9/2022), Disp: 15 g, Rfl: 3     meclizine (ANTIVERT) 25 MG tablet, Take 1 tablet (25 mg) by mouth 3 times daily as needed for dizziness (Patient not taking: No sig reported), Disp: 20 tablet, Rfl: 0     ondansetron (ZOFRAN-ODT) 8 MG ODT tab, Take 1 tablet (8 mg) by mouth every 8 hours as needed for nausea (Patient not taking: No sig reported), Disp: 20 tablet, Rfl: 0  No Known Allergies      EXAM:   BP 92/65   Pulse 71   Wt  50.3 kg (110 lb 14.4 oz)   LMP 08/04/2022 (Approximate)   SpO2 100%   BMI 19.34 kg/m      Physical Exam  Constitutional:       General: She is not in acute distress.     Appearance: Normal appearance. She is not ill-appearing.   HENT:      Head: Normocephalic and atraumatic.      Nose: Mild turbinate hypertrophy with mucosal edema     Mouth/Throat:      Mouth: Mucous membranes are moist.      Pharynx: Oropharynx is clear. No posterior oropharyngeal erythema.   Eyes:      General:         Right eye: No discharge.         Left eye: No discharge.   Cardiovascular:      Rate and Rhythm: Normal rate and regular rhythm.      Heart sounds: Normal heart sounds.   Pulmonary:      Effort: Pulmonary effort is normal.      Breath sounds: Normal breath sounds. No wheezing or rhonchi.   Skin:     General: Skin is warm.      Findings: No erythema or rash.   Neurological:      General: No focal deficit present.      Mental Status: She is alert. Mental status is at baseline.   Psychiatric:         Mood and Affect: Mood normal.         Behavior: Behavior normal.     WORKUP: Skin testing was positive to several trees in 1 weed.  The weed skin test was borderline positive    ASSESSMENT/PLAN:  Lorie Rosales is a 41 year old female seen today with a history of asthma with increased residual volume with a chief complaint of shortness of breath.  She feels like she cannot get enough air in for months at a time which then will resolve spontaneously.  He also has eosinophilia of 1000 and IgE level 542.  Eosinophil count was 200 when checked in 2020.    1.  Skin testing was positive to several trees with a very mild reaction to 1 weed.  2. Since symptoms do seem to occur outside of the tree season this does not seem to be the only explanation for your shortness of breath symptoms.  3. With elevated eosinophil count and elevated IgE, additional treatment options could be considered for your asthma.  This would include biologics such as  either Xolair or Fasenra or Nucala.  4. My first consideration would be a short course of prednisone to see if this does help improve your symptoms.  With the air trapping seen on spirometry as well as elevated eosinophil count, I feel a course of steroids would be helpful especially due to the lack of improvement with Advair.  5. Also recommend the use of Xopenex 2 puffs every 4 hours as needed which was previously prescribed.  6. Recommend follow-up in 2 weeks time.  7. Plan to repeat eosinophil count 4 weeks after prednisone is complete.  Depending on response may do further evaluation of her elevated eosinophil count.  This is not above 1500 and we will not pursue that at this time.  There is no obvious exposure to parasites.    Follow-up in 2 weeks.     Thank you for allowing me to participate in the care of Lorie Rosales.      A total of 45 minutes was spent on the day of the encounter performing chart review, history and exam, documentation, and counseling and coordination of care as noted above.       Adrian Jean MD  Allergy/Immunology  [facility]        Per provider verbal order, placed Adult Environmental Panel scratch test. Once panels were placed, patient was monitored for 15 minutes in clinic.  Provider read test after 15 minutes..  Pt tolerated procedure well.  All questions and concerns were addressed at office visit.     Ana Brandon AG             Again, thank you for allowing me to participate in the care of your patient.        Sincerely,        Adrian Jean MD

## 2022-08-09 NOTE — PROGRESS NOTES
Per provider verbal order, placed Adult Environmental Panel scratch test. Once panels were placed, patient was monitored for 15 minutes in clinic.  Provider read test after 15 minutes..  Pt tolerated procedure well.  All questions and concerns were addressed at office visit.     AMRION Chacon

## 2022-08-09 NOTE — PATIENT INSTRUCTIONS
Skin testing was positive to several trees with a very mild reaction to 1 weed.  Since symptoms do seem to occur outside of the tree season this does not seem to be the only explanation for your shortness of breath symptoms.  With elevated eosinophil count and elevated IgE, additional treatment options could be considered for your asthma.  This would include biologics such as either Xolair or Fasenra or Nucala.  My first consideration would be a short course of prednisone to see if this does help improve your symptoms.  With the air trapping seen on spirometry as well as elevated eosinophil count, I feel a course of steroids would be helpful especially due to the lack of improvement with Advair.  Also recommend the use of Xopenex 2 puffs every 4 hours as needed which was previously prescribed.  Recommend follow-up in 2 weeks time.  Plan to repeat eosinophil count 4 weeks after prednisone is complete.

## 2022-08-09 NOTE — PROGRESS NOTES
Lorie Rosales was seen in the Allergy Clinic at [facility].    Lorie Rosales is a 41 year old female being seen today at the request of Dr. Roldan in consultation for allergies.      She has a lifelong history of having shortness of breath that will persist for several months and then resolved on its own.  She describes a sensation that she is not able to get enough air in.  She does not have any complaints of sneezing, cough, wheezing, eye itching or stridor.  She describes heavy breathing with exercise.  There are times where she has no symptoms at all.    Recently lab work showed eosinophilia of 1000 and an IgE level elevated at 542.    Recent pulmonary function testing was normal for the FEV1 and FVC however the residual volume was elevated consistent with air trapping.  Residual volume was 142% predicted.    She is currently using Advair 100 mcg but has been prescribed a higher dose which she will switch to as soon as the lower dose is complete.  She is taking Advair 1 puff twice daily typically.  She is not sure if it is providing benefit.  She has not tried the Xopenex or levalbuterol prior to exercise but has recently picked this up.    Exhaled nitric oxide is planned at a future visit.    She was skin tested years ago in [place] which was positive to several allergens.  She does not have any classic allergy symptoms but does have shortness of breath which she feels changes seasonally potentially.      Past Medical History:   Diagnosis Date     Tension headache     Neuro consult     Vertigo      Family History   Problem Relation Age of Onset     Aneurysm Mother 49        brain     No past surgical history on file.    ENVIRONMENTAL HISTORY:   Pets inside the house include None.  Do you smoke cigarettes or other recreational drugs? No There is/are 0 smokers living in the house. The house does not have a damp basement.     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 She  lives with her spouse, son and daughter.      Review of Systems   Constitutional: Negative for activity change, chills and fever.   HENT: Negative for congestion, dental problem, ear pain, facial swelling, nosebleeds, postnasal drip, rhinorrhea, sinus pressure and sneezing.    Eyes: Negative for discharge, redness and itching.   Respiratory: Positive for shortness of breath. Negative for cough, chest tightness and wheezing.    Cardiovascular: Negative for chest pain.   Gastrointestinal: Negative for diarrhea, nausea and vomiting.   Musculoskeletal: Negative for arthralgias, joint swelling and myalgias.   Skin: Negative for rash.   Neurological: Negative for headaches.   Hematological: Negative for adenopathy.   Psychiatric/Behavioral: Negative for behavioral problems and self-injury.         Current Outpatient Medications:      FALMINA 0.1-20 MG-MCG tablet, TAKE ONE TABLET BY MOUTH ONCE DAILY, Disp: 84 tablet, Rfl: 3     fluticasone-salmeterol (ADVAIR) 250-50 MCG/ACT inhaler, Inhale 1 puff into the lungs 2 times daily, Disp: 1 each, Rfl: 11     levalbuterol (XOPENEX HFA) 45 MCG/ACT inhaler, Inhale 2 puffs into the lungs every 4 hours as needed for shortness of breath / dyspnea or wheezing (Prior to exercise) (Patient not taking: Reported on 8/9/2022), Disp: 15 g, Rfl: 3     meclizine (ANTIVERT) 25 MG tablet, Take 1 tablet (25 mg) by mouth 3 times daily as needed for dizziness (Patient not taking: No sig reported), Disp: 20 tablet, Rfl: 0     ondansetron (ZOFRAN-ODT) 8 MG ODT tab, Take 1 tablet (8 mg) by mouth every 8 hours as needed for nausea (Patient not taking: No sig reported), Disp: 20 tablet, Rfl: 0  No Known Allergies      EXAM:   BP 92/65   Pulse 71   Wt 50.3 kg (110 lb 14.4 oz)   LMP 08/04/2022 (Approximate)   SpO2 100%   BMI 19.34 kg/m      Physical Exam  Constitutional:       General: She is not in acute distress.     Appearance: Normal appearance. She is not ill-appearing.   HENT:      Head:  Normocephalic and atraumatic.      Nose: Mild turbinate hypertrophy with mucosal edema     Mouth/Throat:      Mouth: Mucous membranes are moist.      Pharynx: Oropharynx is clear. No posterior oropharyngeal erythema.   Eyes:      General:         Right eye: No discharge.         Left eye: No discharge.   Cardiovascular:      Rate and Rhythm: Normal rate and regular rhythm.      Heart sounds: Normal heart sounds.   Pulmonary:      Effort: Pulmonary effort is normal.      Breath sounds: Normal breath sounds. No wheezing or rhonchi.   Skin:     General: Skin is warm.      Findings: No erythema or rash.   Neurological:      General: No focal deficit present.      Mental Status: She is alert. Mental status is at baseline.   Psychiatric:         Mood and Affect: Mood normal.         Behavior: Behavior normal.     WORKUP: Skin testing was positive to several trees in 1 weed.  The weed skin test was borderline positive    ASSESSMENT/PLAN:  Lorie Rosales is a 41 year old female seen today with a history of asthma with increased residual volume with a chief complaint of shortness of breath.  She feels like she cannot get enough air in for months at a time which then will resolve spontaneously.  He also has eosinophilia of 1000 and IgE level 542.  Eosinophil count was 200 when checked in 2020.    1.  Skin testing was positive to several trees with a very mild reaction to 1 weed.  2. Since symptoms do seem to occur outside of the tree season this does not seem to be the only explanation for your shortness of breath symptoms.  3. With elevated eosinophil count and elevated IgE, additional treatment options could be considered for your asthma.  This would include biologics such as either Xolair or Fasenra or Nucala.  4. My first consideration would be a short course of prednisone to see if this does help improve your symptoms.  With the air trapping seen on spirometry as well as elevated eosinophil count, I feel a course of  steroids would be helpful especially due to the lack of improvement with Advair.  5. Also recommend the use of Xopenex 2 puffs every 4 hours as needed which was previously prescribed.  6. Recommend follow-up in 2 weeks time.  7. Plan to repeat eosinophil count 4 weeks after prednisone is complete.  Depending on response may do further evaluation of her elevated eosinophil count.  This is not above 1500 and we will not pursue that at this time.  There is no obvious exposure to parasites.    Follow-up in 2 weeks.     Thank you for allowing me to participate in the care of Lorie Rosales.      A total of 45 minutes was spent on the day of the encounter performing chart review, history and exam, documentation, and counseling and coordination of care as noted above.       Adrian Jean MD  Allergy/Immunology  [facility]

## 2022-08-16 ENCOUNTER — OFFICE VISIT (OUTPATIENT)
Dept: FAMILY MEDICINE | Facility: CLINIC | Age: 42
End: 2022-08-16
Payer: COMMERCIAL

## 2022-08-16 VITALS
SYSTOLIC BLOOD PRESSURE: 109 MMHG | HEART RATE: 62 BPM | HEIGHT: 63 IN | DIASTOLIC BLOOD PRESSURE: 72 MMHG | TEMPERATURE: 97.6 F | BODY MASS INDEX: 19.49 KG/M2 | WEIGHT: 110 LBS | OXYGEN SATURATION: 99 %

## 2022-08-16 DIAGNOSIS — Z13.0 SCREENING FOR IRON DEFICIENCY ANEMIA: ICD-10-CM

## 2022-08-16 DIAGNOSIS — E78.1 HIGH TRIGLYCERIDES: ICD-10-CM

## 2022-08-16 DIAGNOSIS — Z30.41 ENCOUNTER FOR SURVEILLANCE OF CONTRACEPTIVE PILLS: ICD-10-CM

## 2022-08-16 DIAGNOSIS — Z12.31 VISIT FOR SCREENING MAMMOGRAM: ICD-10-CM

## 2022-08-16 DIAGNOSIS — Z13.29 SCREENING FOR THYROID DISORDER: ICD-10-CM

## 2022-08-16 DIAGNOSIS — R73.03 PRE-DIABETES: ICD-10-CM

## 2022-08-16 DIAGNOSIS — M25.562 ACUTE PAIN OF LEFT KNEE: ICD-10-CM

## 2022-08-16 DIAGNOSIS — Z13.220 LIPID SCREENING: ICD-10-CM

## 2022-08-16 DIAGNOSIS — Z00.00 ROUTINE GENERAL MEDICAL EXAMINATION AT A HEALTH CARE FACILITY: Primary | ICD-10-CM

## 2022-08-16 DIAGNOSIS — Z13.1 SCREENING FOR DIABETES MELLITUS: ICD-10-CM

## 2022-08-16 LAB
ALBUMIN SERPL-MCNC: 3.7 G/DL (ref 3.4–5)
ALP SERPL-CCNC: 60 U/L (ref 40–150)
ALT SERPL W P-5'-P-CCNC: 17 U/L (ref 0–50)
ANION GAP SERPL CALCULATED.3IONS-SCNC: 10 MMOL/L (ref 3–14)
AST SERPL W P-5'-P-CCNC: 8 U/L (ref 0–45)
BILIRUB SERPL-MCNC: 0.5 MG/DL (ref 0.2–1.3)
BUN SERPL-MCNC: 13 MG/DL (ref 7–30)
CALCIUM SERPL-MCNC: 9.3 MG/DL (ref 8.5–10.1)
CHLORIDE BLD-SCNC: 103 MMOL/L (ref 94–109)
CHOLEST SERPL-MCNC: 177 MG/DL
CO2 SERPL-SCNC: 25 MMOL/L (ref 20–32)
CREAT SERPL-MCNC: 0.68 MG/DL (ref 0.52–1.04)
ERYTHROCYTE [DISTWIDTH] IN BLOOD BY AUTOMATED COUNT: 11.8 % (ref 10–15)
FASTING STATUS PATIENT QL REPORTED: YES
GFR SERPL CREATININE-BSD FRML MDRD: >90 ML/MIN/1.73M2
GLUCOSE BLD-MCNC: 97 MG/DL (ref 70–99)
HBA1C MFR BLD: 5.8 % (ref 0–5.6)
HCT VFR BLD AUTO: 42.4 % (ref 35–47)
HDLC SERPL-MCNC: 72 MG/DL
HGB BLD-MCNC: 14 G/DL (ref 11.7–15.7)
LDLC SERPL CALC-MCNC: 66 MG/DL
MCH RBC QN AUTO: 30.2 PG (ref 26.5–33)
MCHC RBC AUTO-ENTMCNC: 33 G/DL (ref 31.5–36.5)
MCV RBC AUTO: 91 FL (ref 78–100)
NONHDLC SERPL-MCNC: 105 MG/DL
PLATELET # BLD AUTO: 323 10E3/UL (ref 150–450)
POTASSIUM BLD-SCNC: 3.3 MMOL/L (ref 3.4–5.3)
PROT SERPL-MCNC: 6.9 G/DL (ref 6.8–8.8)
RBC # BLD AUTO: 4.64 10E6/UL (ref 3.8–5.2)
SODIUM SERPL-SCNC: 138 MMOL/L (ref 133–144)
TRIGL SERPL-MCNC: 193 MG/DL
TSH SERPL DL<=0.005 MIU/L-ACNC: 1.17 MU/L (ref 0.4–4)
WBC # BLD AUTO: 9.2 10E3/UL (ref 4–11)

## 2022-08-16 PROCEDURE — 90471 IMMUNIZATION ADMIN: CPT | Performed by: NURSE PRACTITIONER

## 2022-08-16 PROCEDURE — 84443 ASSAY THYROID STIM HORMONE: CPT | Performed by: NURSE PRACTITIONER

## 2022-08-16 PROCEDURE — 80061 LIPID PANEL: CPT | Performed by: NURSE PRACTITIONER

## 2022-08-16 PROCEDURE — 85027 COMPLETE CBC AUTOMATED: CPT | Performed by: NURSE PRACTITIONER

## 2022-08-16 PROCEDURE — 80053 COMPREHEN METABOLIC PANEL: CPT | Performed by: NURSE PRACTITIONER

## 2022-08-16 PROCEDURE — 99213 OFFICE O/P EST LOW 20 MIN: CPT | Mod: 25 | Performed by: NURSE PRACTITIONER

## 2022-08-16 PROCEDURE — 99396 PREV VISIT EST AGE 40-64: CPT | Mod: 25 | Performed by: NURSE PRACTITIONER

## 2022-08-16 PROCEDURE — 90715 TDAP VACCINE 7 YRS/> IM: CPT | Performed by: NURSE PRACTITIONER

## 2022-08-16 PROCEDURE — 36415 COLL VENOUS BLD VENIPUNCTURE: CPT | Performed by: NURSE PRACTITIONER

## 2022-08-16 PROCEDURE — 83036 HEMOGLOBIN GLYCOSYLATED A1C: CPT | Performed by: NURSE PRACTITIONER

## 2022-08-16 RX ORDER — LEVONORGESTREL/ETHIN.ESTRADIOL 0.1-0.02MG
1 TABLET ORAL DAILY
Qty: 84 TABLET | Refills: 3 | Status: SHIPPED | OUTPATIENT
Start: 2022-08-16 | End: 2023-10-06

## 2022-08-16 ASSESSMENT — ENCOUNTER SYMPTOMS
ARTHRALGIAS: 0
CHILLS: 0
FEVER: 0
COUGH: 0
HEMATOCHEZIA: 0
WEAKNESS: 0
HEARTBURN: 0
SHORTNESS OF BREATH: 1
JOINT SWELLING: 0
DIARRHEA: 0
HEMATURIA: 0
FREQUENCY: 0
DIZZINESS: 0
NERVOUS/ANXIOUS: 0
SORE THROAT: 0
PARESTHESIAS: 0
PALPITATIONS: 0
EYE PAIN: 0
MYALGIAS: 0
NAUSEA: 0
CONSTIPATION: 0
HEADACHES: 1
DYSURIA: 0
ABDOMINAL PAIN: 0

## 2022-08-16 ASSESSMENT — ASTHMA QUESTIONNAIRES
QUESTION_4 LAST FOUR WEEKS HOW OFTEN HAVE YOU USED YOUR RESCUE INHALER OR NEBULIZER MEDICATION (SUCH AS ALBUTEROL): ONE OR TWO TIMES PER DAY
QUESTION_2 LAST FOUR WEEKS HOW OFTEN HAVE YOU HAD SHORTNESS OF BREATH: ONCE A DAY
ACT_TOTALSCORE: 15
QUESTION_1 LAST FOUR WEEKS HOW MUCH OF THE TIME DID YOUR ASTHMA KEEP YOU FROM GETTING AS MUCH DONE AT WORK, SCHOOL OR AT HOME: SOME OF THE TIME
QUESTION_5 LAST FOUR WEEKS HOW WOULD YOU RATE YOUR ASTHMA CONTROL: SOMEWHAT CONTROLLED
ACT_TOTALSCORE: 15
QUESTION_3 LAST FOUR WEEKS HOW OFTEN DID YOUR ASTHMA SYMPTOMS (WHEEZING, COUGHING, SHORTNESS OF BREATH, CHEST TIGHTNESS OR PAIN) WAKE YOU UP AT NIGHT OR EARLIER THAN USUAL IN THE MORNING: NOT AT ALL

## 2022-08-16 NOTE — PROGRESS NOTES
SUBJECTIVE:   CC: Lorie Rosales is an 41 year old woman who presents for preventive health visit.     Patient has been advised of split billing requirements and indicates understanding: Yes  Healthy Habits:     Getting at least 3 servings of Calcium per day:  NO    Bi-annual eye exam:  NO    Dental care twice a year:  Yes    Sleep apnea or symptoms of sleep apnea:  None    Diet:  Regular (no restrictions)    Frequency of exercise:  1 day/week    Duration of exercise:  Less than 15 minutes    Taking medications regularly:  Yes    PHQ-2 Total Score: 0    Additional concerns today:  Yes        Additional;: Knee pains, maybe mammogram    Has been running 5k, noticing left knee pain.        Today's PHQ-2 Score:   PHQ-2 ( 1999 Pfizer) 8/16/2022   Q1: Little interest or pleasure in doing things 0   Q2: Feeling down, depressed or hopeless 0   PHQ-2 Score 0   PHQ-2 Total Score (12-17 Years)- Positive if 3 or more points; Administer PHQ-A if positive -   Q1: Little interest or pleasure in doing things Not at all   Q2: Feeling down, depressed or hopeless Not at all   PHQ-2 Score 0       Abuse: Current or Past (Physical, Sexual or Emotional) - No  Do you feel safe in your environment? Yes    Have you ever done Advance Care Planning? (For example, a Health Directive, POLST, or a discussion with a medical provider or your loved ones about your wishes): No, advance care planning information given to patient to review.  Patient plans to discuss their wishes with loved ones or provider.      Social History     Tobacco Use     Smoking status: Never Smoker     Smokeless tobacco: Never Used   Substance Use Topics     Alcohol use: No     If you drink alcohol do you typically have >3 drinks per day or >7 drinks per week? No    Alcohol Use 8/16/2022   Prescreen: >3 drinks/day or >7 drinks/week? Not Applicable   Prescreen: >3 drinks/day or >7 drinks/week? -   No flowsheet data found.    Reviewed orders with patient.  Reviewed health  "maintenance and updated orders accordingly - Yes  Lab work is in process    Breast Cancer Screening:    Breast CA Risk Assessment (FHS-7) 8/16/2022   Do you have a family history of breast, colon, or ovarian cancer? No / Unknown         Mammogram Screening - Offered annual screening and updated Health Maintenance for mutual plan based on risk factor consideration    Pertinent mammograms are reviewed under the imaging tab.    History of abnormal Pap smear: NO - age 30-65 PAP every 5 years with negative HPV co-testing recommended  PAP / HPV Latest Ref Rng & Units 3/20/2019   PAP (Historical) - NIL   HPV16 NEG:Negative Negative   HPV18 NEG:Negative Negative   HRHPV NEG:Negative Negative     Reviewed and updated as needed this visit by clinical staff   Tobacco  Allergies    Med Hx  Surg Hx  Fam Hx  Soc Hx          Reviewed and updated as needed this visit by Provider                       Review of Systems   Constitutional: Negative for chills and fever.   HENT: Negative for congestion, ear pain, hearing loss and sore throat.    Eyes: Negative for pain and visual disturbance.   Respiratory: Positive for shortness of breath. Negative for cough.    Cardiovascular: Negative for chest pain, palpitations and peripheral edema.   Gastrointestinal: Negative for abdominal pain, constipation, diarrhea, heartburn, hematochezia and nausea.   Genitourinary: Negative for dysuria, frequency, genital sores, hematuria and urgency.   Musculoskeletal: Negative for arthralgias, joint swelling and myalgias.   Neurological: Positive for headaches. Negative for dizziness, weakness and paresthesias.   Psychiatric/Behavioral: Negative for mood changes. The patient is not nervous/anxious.           OBJECTIVE:   /72 (BP Location: Right arm, Patient Position: Sitting, Cuff Size: Adult Regular)   Pulse 62   Temp 97.6  F (36.4  C) (Temporal)   Ht 1.605 m (5' 3.19\")   Wt 49.9 kg (110 lb)   LMP 08/04/2022 (Approximate)   SpO2 99%   " BMI 19.37 kg/m    Physical Exam  GENERAL: healthy, alert and no distress  EYES: Eyes grossly normal to inspection, PERRL and conjunctivae and sclerae normal  HENT: ear canals and TM's normal, nose and mouth without ulcers or lesions  NECK: no adenopathy, no asymmetry, masses, or scars and thyroid normal to palpation  RESP: lungs clear to auscultation - no rales, rhonchi or wheezes  BREAST: normal without masses, tenderness or nipple discharge and no palpable axillary masses or adenopathy  CV: regular rate and rhythm, normal S1 S2, no S3 or S4, no murmur, click or rub, no peripheral edema and peripheral pulses strong  ABDOMEN: soft, nontender, no hepatosplenomegaly, no masses and bowel sounds normal  MS: no gross musculoskeletal defects noted, no edema  SKIN: no suspicious lesions or rashes  NEURO: Normal strength and tone, mentation intact and speech normal  PSYCH: mentation appears normal, affect normal/bright        ASSESSMENT/PLAN:   (Z00.00) Routine general medical examination at a health care facility  (primary encounter diagnosis)  Comment: Reviewed medical/social/family history and health maintenance  Plan: Comprehensive metabolic panel (BMP + Alb, Alk         Phos, ALT, AST, Total. Bili, TP)            (M25.562) Acute pain of left knee  Comment: Likely overuse.  Discussed PT and patient declined at this time, also recommended stability shoes and visiting a running store for shoe fitting.  Discussed RICE and NSAIDs.  Plan:     (Z12.31) Visit for screening mammogram  Comment:   Plan: MA SCREENING DIGITAL BILAT - Future  (s+30)            (Z13.29) Screening for thyroid disorder  Comment:   Plan: TSH with free T4 reflex            (Z13.0) Screening for iron deficiency anemia  Comment:   Plan: CBC with platelets            (Z13.1) Screening for diabetes mellitus  Comment:   Plan: Hemoglobin A1c            (Z13.220) Lipid screening  Comment:   Plan: Lipid panel reflex to direct LDL Fasting            (Z30.41)  "Encounter for surveillance of contraceptive pills  Comment:   Plan: levonorgestrel-ethinyl estradiol (FALMINA)         0.1-20 MG-MCG tablet              Patient has been advised of split billing requirements and indicates understanding: Yes    COUNSELING:  Reviewed preventive health counseling, as reflected in patient instructions    Estimated body mass index is 19.37 kg/m  as calculated from the following:    Height as of this encounter: 1.605 m (5' 3.19\").    Weight as of this encounter: 49.9 kg (110 lb).        She reports that she has never smoked. She has never used smokeless tobacco.      Counseling Resources:  ATP IV Guidelines  Pooled Cohorts Equation Calculator  Breast Cancer Risk Calculator  BRCA-Related Cancer Risk Assessment: FHS-7 Tool  FRAX Risk Assessment  ICSI Preventive Guidelines  Dietary Guidelines for Americans, 2010  USDA's MyPlate  ASA Prophylaxis  Lung CA Screening    GLENYS Portillo CNP  [facility]  "

## 2022-08-16 NOTE — NURSING NOTE
Prior to immunization administration, verified patients identity using patient s name and date of birth. Please see Immunization Activity for additional information.     Screening Questionnaire for Adult Immunization    Are you sick today?   No   Do you have allergies to medications, food, a vaccine component or latex?   No   Have you ever had a serious reaction after receiving a vaccination?   No   Do you have a long-term health problem with heart, lung, kidney, or metabolic disease (e.g., diabetes), asthma, a blood disorder, no spleen, complement component deficiency, a cochlear implant, or a spinal fluid leak?  Are you on long-term aspirin therapy?   No   Do you have cancer, leukemia, HIV/AIDS, or any other immune system problem?   No   Do you have a parent, brother, or sister with an immune system problem?   No   In the past 3 months, have you taken medications that affect  your immune system, such as prednisone, other steroids, or anticancer drugs; drugs for the treatment of rheumatoid arthritis, Crohn s disease, or psoriasis; or have you had radiation treatments?   Prednisone    Have you had a seizure, or a brain or other nervous system problem?   No   During the past year, have you received a transfusion of blood or blood    products, or been given immune (gamma) globulin or antiviral drug?   No   For women: Are you pregnant or is there a chance you could become       pregnant during the next month?   No   Have you received any vaccinations in the past 4 weeks?   No     Immunization questionnaire was positive for at least one answer.        Per orders of Ruthie Barkley, injection of Tdap (Adacel) given by Deloris Sparks. Patient instructed to remain in clinic for 15 minutes afterwards, and to report any adverse reaction to me immediately.       Screening performed by Deloris Sparks on 8/16/2022 at 11:45 AM.

## 2022-08-16 NOTE — NURSING NOTE
Patient failed ACT. Repeat in 4 weeks   Gave patient a copy of ACT to take home    Deloris Sparks MA

## 2022-08-25 ENCOUNTER — OFFICE VISIT (OUTPATIENT)
Dept: ALLERGY | Facility: CLINIC | Age: 42
End: 2022-08-25
Payer: COMMERCIAL

## 2022-08-25 VITALS
DIASTOLIC BLOOD PRESSURE: 64 MMHG | HEART RATE: 86 BPM | SYSTOLIC BLOOD PRESSURE: 97 MMHG | OXYGEN SATURATION: 97 % | WEIGHT: 112.5 LBS | BODY MASS INDEX: 19.81 KG/M2

## 2022-08-25 DIAGNOSIS — J30.1 SEASONAL ALLERGIC RHINITIS DUE TO POLLEN: ICD-10-CM

## 2022-08-25 DIAGNOSIS — D72.10 EOSINOPHILIA, UNSPECIFIED TYPE: ICD-10-CM

## 2022-08-25 DIAGNOSIS — J45.40 ASTHMA, MODERATE PERSISTENT, POORLY-CONTROLLED: Primary | ICD-10-CM

## 2022-08-25 PROCEDURE — 99214 OFFICE O/P EST MOD 30 MIN: CPT | Performed by: INTERNAL MEDICINE

## 2022-08-25 ASSESSMENT — ASTHMA QUESTIONNAIRES: ACT_TOTALSCORE: 15

## 2022-08-25 NOTE — PATIENT INSTRUCTIONS
Start Trelegy 1 puff daily  Follow  up in 1 month  Stop Advair  Continue Xopenex 2 puffs every 4 hours as needed.   Allergy shots will not be started at this time  Biologics briefly discussed, will review in detail if no improvement after Trelegy starts.  CBC and diff to be repeated next week.

## 2022-08-25 NOTE — LETTER
8/25/2022         RE: Lorie Rosales  [address] [place] [address]        Dear Colleague,    Thank you for referring your patient, Lorie Rosales, to the [facility] [place]. Please see a copy of my visit note below.    Lorie Rosales was seen in the Allergy Clinic at [facility].    Lorie Rosales is a 42 year old female being seen today for ongoing evaluation of allergies and asthma.    Since the last visit the patient has mildly improved.     She has a lifelong history of having shortness of breath that will persist for several months and then resolve on its own.  She describes a sensation that she is not able to get enough air in.  Recently lab work showed eosinophilia of 1000 and an IgE level elevated at 542.     Recent pulmonary function testing was normal for the FEV1 and FVC however the residual volume was elevated consistent with air trapping.  Residual volume was 142% predicted.     Since the last visit she was treated with prednisone which she thought was minimally helpful.  She also started a higher dose Advair 1 puff twice daily.  In addition she was prescribed Xopenex which she is now using 2 puffs as needed. She does find the Xopenex does provide some benefit and helps relieve her symptoms quickly.  She finds it still is hard to get a deep breath and she still has persistent shortness of breath.  However she feels that the sensation of difficulty getting the air in has mildly improved.    At the last appointment skin testing was positive to trees and 1 weed.    She also sees Pulmonology.    Past Medical History:   Diagnosis Date     Tension headache     Neuro consult     Vertigo      Family History   Problem Relation Age of Onset     Aneurysm Mother 49        brain     No past surgical history on file.      Current Outpatient Medications:      fluticasone-salmeterol (ADVAIR) 250-50 MCG/ACT inhaler, Inhale 1 puff into the lungs 2 times daily, Disp:  1 each, Rfl: 11     Fluticasone-Umeclidin-Vilanterol (TRELEGY ELLIPTA) 200-62.5-25 MCG/INH oral inhaler, Inhale 1 puff into the lungs daily, Disp: 1 each, Rfl: 3     levonorgestrel-ethinyl estradiol (FALMINA) 0.1-20 MG-MCG tablet, Take 1 tablet by mouth daily, Disp: 84 tablet, Rfl: 3     predniSONE (DELTASONE) 20 MG tablet, Take 2 tabs daily x 4 days and 1 tab daily x 4 days, Disp: 12 tablet, Rfl: 0  Allergies   Allergen Reactions     [place] [[employer]]          EXAM:   BP 97/64   Pulse 86   Wt 51 kg (112 lb 8 oz)   LMP 08/04/2022 (Approximate)   SpO2 97%   BMI 19.81 kg/m      Constitutional:       General: She is not in acute distress.     Appearance: Normal appearance. She is not ill-appearing.   HENT:      Head: Normocephalic and atraumatic.      Nose: Nose normal. No congestion or rhinorrhea.      Mouth/Throat:      Mouth: Mucous membranes are moist.      Pharynx: Oropharynx is clear. No posterior oropharyngeal erythema.   Eyes:      General:         Right eye: No discharge.         Left eye: No discharge.   Cardiovascular:      Rate and Rhythm: Normal rate and regular rhythm.      Heart sounds: Normal heart sounds.   Pulmonary:      Effort: Pulmonary effort is normal.      Breath sounds: Normal breath sounds. No wheezing or rhonchi.   Skin:     General: Skin is warm.      Findings: No erythema or rash.   Neurological:      General: No focal deficit present.      Mental Status: She is alert. Mental status is at baseline.   Psychiatric:         Mood and Affect: Mood normal.         Behavior: Behavior normal.       ASSESSMENT/PLAN:  Lorie Rosales is a 42 year old female who has allergic rhinitis as well as asthma.  Her symptoms are somewhat atypical but is a sensation of not being able to take a deep breath in his room major complaint.  Her spirometry overall was normal except for some evidence of air trapping.  She did not respond to prednisone which is also atypical.  Her blood work does show some  "elevation of eosinophils and also an IgE level.  Over the last several years her symptoms will fluctuate and that she will be asymptomatic and then symptomatic without obvious cause.    1.  We will repeat a eosinophil count to see if her eosinophils have normalized after treatment with the steroids.    2.  Do not see a role for allergy shots at this time but may consider in the future.  Depending on the eosinophil count may do additional blood tests.    Will switch from Advair to Trelegy to see if the anticholinergic bronchodilator effect will provide any additional benefit with the symptom of \"hard to get a deep breath in\".    Dysfunctional breathing syndrome is another consideration however with the somewhat abnormal spirometry (Increased RV) and also elevated eosinophil count will need to consider biologic therapy for asthma not responding to typical therapies.  Especially with the lack of improvement with typical asthma therapies.  We will discuss with pulmonology depending on how she is doing in 4 weeks for other treatment considerations.    She will continue with the use of Xopenex as needed.    Follow-up in 4-week    Thank you for allowing me to participate in the care of Lorie Rosales.      A total of 35 minutes was spent on the day of the encounter performing chart review, history and exam, documentation, and counseling and coordination of care as noted above.       Adrian Jean MD  Allergy/Immunology  [facility]        Again, thank you for allowing me to participate in the care of your patient.        Sincerely,        Adrian Jean MD    "

## 2022-08-25 NOTE — PROGRESS NOTES
Lorie Rosales was seen in the Allergy Clinic at [facility].    Lorie Rosales is a 42 year old female being seen today for ongoing evaluation of allergies and asthma.    Since the last visit the patient has mildly improved.     She has a lifelong history of having shortness of breath that will persist for several months and then resolve on its own.  She describes a sensation that she is not able to get enough air in.  Recently lab work showed eosinophilia of 1000 and an IgE level elevated at 542.     Recent pulmonary function testing was normal for the FEV1 and FVC however the residual volume was elevated consistent with air trapping.  Residual volume was 142% predicted.     Since the last visit she was treated with prednisone which she thought was minimally helpful.  She also started a higher dose Advair 1 puff twice daily.  In addition she was prescribed Xopenex which she is now using 2 puffs as needed. She does find the Xopenex does provide some benefit and helps relieve her symptoms quickly.  She finds it still is hard to get a deep breath and she still has persistent shortness of breath.  However she feels that the sensation of difficulty getting the air in has mildly improved.    At the last appointment skin testing was positive to trees and 1 weed.    She also sees Pulmonology.    Past Medical History:   Diagnosis Date     Tension headache     Neuro consult     Vertigo      Family History   Problem Relation Age of Onset     Aneurysm Mother 49        brain     No past surgical history on file.      Current Outpatient Medications:      fluticasone-salmeterol (ADVAIR) 250-50 MCG/ACT inhaler, Inhale 1 puff into the lungs 2 times daily, Disp: 1 each, Rfl: 11     Fluticasone-Umeclidin-Vilanterol (TRELEGY ELLIPTA) 200-62.5-25 MCG/INH oral inhaler, Inhale 1 puff into the lungs daily, Disp: 1 each, Rfl: 3     levonorgestrel-ethinyl estradiol (FALMINA) 0.1-20 MG-MCG tablet, Take 1 tablet by mouth  daily, Disp: 84 tablet, Rfl: 3     predniSONE (DELTASONE) 20 MG tablet, Take 2 tabs daily x 4 days and 1 tab daily x 4 days, Disp: 12 tablet, Rfl: 0  Allergies   Allergen Reactions     [place] [[employer]]          EXAM:   BP 97/64   Pulse 86   Wt 51 kg (112 lb 8 oz)   LMP 08/04/2022 (Approximate)   SpO2 97%   BMI 19.81 kg/m      Constitutional:       General: She is not in acute distress.     Appearance: Normal appearance. She is not ill-appearing.   HENT:      Head: Normocephalic and atraumatic.      Nose: Nose normal. No congestion or rhinorrhea.      Mouth/Throat:      Mouth: Mucous membranes are moist.      Pharynx: Oropharynx is clear. No posterior oropharyngeal erythema.   Eyes:      General:         Right eye: No discharge.         Left eye: No discharge.   Cardiovascular:      Rate and Rhythm: Normal rate and regular rhythm.      Heart sounds: Normal heart sounds.   Pulmonary:      Effort: Pulmonary effort is normal.      Breath sounds: Normal breath sounds. No wheezing or rhonchi.   Skin:     General: Skin is warm.      Findings: No erythema or rash.   Neurological:      General: No focal deficit present.      Mental Status: She is alert. Mental status is at baseline.   Psychiatric:         Mood and Affect: Mood normal.         Behavior: Behavior normal.       ASSESSMENT/PLAN:  Lorie Rosales is a 42 year old female who has allergic rhinitis as well as asthma.  Her symptoms are somewhat atypical but is a sensation of not being able to take a deep breath in his room major complaint.  Her spirometry overall was normal except for some evidence of air trapping.  She did not respond to prednisone which is also atypical.  Her blood work does show some elevation of eosinophils and also an IgE level.  Over the last several years her symptoms will fluctuate and that she will be asymptomatic and then symptomatic without obvious cause.    1.  We will repeat a eosinophil count to see if her eosinophils have  "normalized after treatment with the steroids.    2.  Do not see a role for allergy shots at this time but may consider in the future.  Depending on the eosinophil count may do additional blood tests.    Will switch from Advair to Trelegy to see if the anticholinergic bronchodilator effect will provide any additional benefit with the symptom of \"hard to get a deep breath in\".    Dysfunctional breathing syndrome is another consideration however with the somewhat abnormal spirometry (Increased RV) and also elevated eosinophil count will need to consider biologic therapy for asthma not responding to typical therapies.  Especially with the lack of improvement with typical asthma therapies.  We will discuss with pulmonology depending on how she is doing in 4 weeks for other treatment considerations.    She will continue with the use of Xopenex as needed.    Follow-up in 4-week    Thank you for allowing me to participate in the care of Lorie Rosales.      A total of 35 minutes was spent on the day of the encounter performing chart review, history and exam, documentation, and counseling and coordination of care as noted above.       Adrian Jean MD  Allergy/Immunology  [facility]    "

## 2022-09-01 ENCOUNTER — LAB (OUTPATIENT)
Dept: LAB | Facility: CLINIC | Age: 42
End: 2022-09-01
Payer: COMMERCIAL

## 2022-09-01 DIAGNOSIS — D72.10 EOSINOPHILIA, UNSPECIFIED TYPE: ICD-10-CM

## 2022-09-01 LAB
BASOPHILS # BLD AUTO: 0 10E3/UL (ref 0–0.2)
BASOPHILS NFR BLD AUTO: 0 %
EOSINOPHIL # BLD AUTO: 0.3 10E3/UL (ref 0–0.7)
EOSINOPHIL NFR BLD AUTO: 4 %
ERYTHROCYTE [DISTWIDTH] IN BLOOD BY AUTOMATED COUNT: 11.9 % (ref 10–15)
HCT VFR BLD AUTO: 40 % (ref 35–47)
HGB BLD-MCNC: 13.6 G/DL (ref 11.7–15.7)
IMM GRANULOCYTES # BLD: 0 10E3/UL
IMM GRANULOCYTES NFR BLD: 0 %
LYMPHOCYTES # BLD AUTO: 2.2 10E3/UL (ref 0.8–5.3)
LYMPHOCYTES NFR BLD AUTO: 37 %
MCH RBC QN AUTO: 31.3 PG (ref 26.5–33)
MCHC RBC AUTO-ENTMCNC: 34 G/DL (ref 31.5–36.5)
MCV RBC AUTO: 92 FL (ref 78–100)
MONOCYTES # BLD AUTO: 0.6 10E3/UL (ref 0–1.3)
MONOCYTES NFR BLD AUTO: 9 %
NEUTROPHILS # BLD AUTO: 3 10E3/UL (ref 1.6–8.3)
NEUTROPHILS NFR BLD AUTO: 50 %
NRBC # BLD AUTO: 0 10E3/UL
NRBC BLD AUTO-RTO: 0 /100
PLATELET # BLD AUTO: 245 10E3/UL (ref 150–450)
RBC # BLD AUTO: 4.35 10E6/UL (ref 3.8–5.2)
WBC # BLD AUTO: 6 10E3/UL (ref 4–11)

## 2022-09-01 PROCEDURE — 36415 COLL VENOUS BLD VENIPUNCTURE: CPT | Performed by: PATHOLOGY

## 2022-09-01 PROCEDURE — 85025 COMPLETE CBC W/AUTO DIFF WBC: CPT | Performed by: PATHOLOGY

## 2022-09-18 ENCOUNTER — HEALTH MAINTENANCE LETTER (OUTPATIENT)
Age: 42
End: 2022-09-18

## 2022-09-23 ENCOUNTER — OFFICE VISIT (OUTPATIENT)
Dept: ALLERGY | Facility: CLINIC | Age: 42
End: 2022-09-23
Payer: COMMERCIAL

## 2022-09-23 VITALS
HEART RATE: 55 BPM | SYSTOLIC BLOOD PRESSURE: 123 MMHG | WEIGHT: 110.3 LBS | BODY MASS INDEX: 19.42 KG/M2 | DIASTOLIC BLOOD PRESSURE: 84 MMHG | OXYGEN SATURATION: 99 %

## 2022-09-23 DIAGNOSIS — J30.1 SEASONAL ALLERGIC RHINITIS DUE TO POLLEN: Primary | ICD-10-CM

## 2022-09-23 DIAGNOSIS — J45.40 MODERATE PERSISTENT EXTRINSIC ASTHMA WITHOUT COMPLICATION: ICD-10-CM

## 2022-09-23 PROCEDURE — 99213 OFFICE O/P EST LOW 20 MIN: CPT | Performed by: INTERNAL MEDICINE

## 2022-09-23 RX ORDER — LEVALBUTEROL TARTRATE 45 UG/1
2 AEROSOL, METERED ORAL EVERY 4 HOURS PRN
COMMUNITY

## 2022-09-23 ASSESSMENT — ASTHMA QUESTIONNAIRES
ACT_TOTALSCORE: 21
QUESTION_2 LAST FOUR WEEKS HOW OFTEN HAVE YOU HAD SHORTNESS OF BREATH: THREE TO SIX TIMES A WEEK
QUESTION_1 LAST FOUR WEEKS HOW MUCH OF THE TIME DID YOUR ASTHMA KEEP YOU FROM GETTING AS MUCH DONE AT WORK, SCHOOL OR AT HOME: NONE OF THE TIME
QUESTION_5 LAST FOUR WEEKS HOW WOULD YOU RATE YOUR ASTHMA CONTROL: WELL CONTROLLED
QUESTION_4 LAST FOUR WEEKS HOW OFTEN HAVE YOU USED YOUR RESCUE INHALER OR NEBULIZER MEDICATION (SUCH AS ALBUTEROL): ONCE A WEEK OR LESS
QUESTION_3 LAST FOUR WEEKS HOW OFTEN DID YOUR ASTHMA SYMPTOMS (WHEEZING, COUGHING, SHORTNESS OF BREATH, CHEST TIGHTNESS OR PAIN) WAKE YOU UP AT NIGHT OR EARLIER THAN USUAL IN THE MORNING: NOT AT ALL
ACT_TOTALSCORE: 21

## 2022-09-23 NOTE — LETTER
9/23/2022         RE: Lorie Rosales  [address] [place] [address]        Dear Colleague,    Thank you for referring your patient, Lorie Rosales, to the [facility] [place]. Please see a copy of my visit note below.    Lorie Rosales was seen in the Allergy Clinic at [facility].    Lorie Rosales is a 42 year old female being seen today for ongoing evaluation of breathing    Since the last visit the patient has been somewhat better.    Chief Complaint   Patient presents with     Asthma Recheck     Patient following up on breathing symptoms. Since starting Trelegy Ellipta inhaler symptoms have improved slightly.       Lorie is seen today for ongoing evaluation of asthma.  She has seen pulmonology.  She has a lifelong history of having shortness of breath that persist for several months and then resolves on its own.  She describes it as sensation that is difficult to get air in.    She had elevated eosinophilia of 1000 and a repeat after prednisone her eosinophil count dropped to 300 several weeks after stopping prednisone.  IgE was also elevated.  With prednisone she only had partial benefit.  Spirometry was performed by pulmonology and did show some increased residual volume.    Skin testing was positive to trees and weeds.    She does have increased stress lately.     She stopped Advair and started Trelegy had some partial improvement.  Still having some shortness of breath which she describes as difficulty getting air into her lungs.      Past Medical History:   Diagnosis Date     Tension headache     Neuro consult     Vertigo      Family History   Problem Relation Age of Onset     Aneurysm Mother 49        brain     No past surgical history on file.      Current Outpatient Medications:      Fluticasone-Umeclidin-Vilanterol (TRELEGY ELLIPTA) 200-62.5-25 MCG/INH oral inhaler, Inhale 1 puff into the lungs daily, Disp: 1 each, Rfl: 3     levalbuterol (XOPENEX  HFA) 45 MCG/ACT inhaler, Inhale 2 puffs into the lungs every 4 hours as needed for shortness of breath / dyspnea or wheezing, Disp: , Rfl:      levonorgestrel-ethinyl estradiol (FALMINA) 0.1-20 MG-MCG tablet, Take 1 tablet by mouth daily, Disp: 84 tablet, Rfl: 3     fluticasone-salmeterol (ADVAIR) 250-50 MCG/ACT inhaler, Inhale 1 puff into the lungs 2 times daily (Patient not taking: Reported on 9/23/2022), Disp: 1 each, Rfl: 11     predniSONE (DELTASONE) 20 MG tablet, Take 2 tabs daily x 4 days and 1 tab daily x 4 days (Patient not taking: Reported on 9/23/2022), Disp: 12 tablet, Rfl: 0  Allergies   Allergen Reactions     [place] [[employer]]          EXAM:   /84   Pulse 55   Wt 50 kg (110 lb 4.8 oz)   LMP 09/19/2022 (Approximate)   SpO2 99%   BMI 19.42 kg/m      Constitutional:       General: She is not in acute distress.     Appearance: Normal appearance. She is not ill-appearing.   HENT:      Head: Normocephalic and atraumatic.      Nose: Nose normal. No congestion or rhinorrhea.      Mouth/Throat:      Mouth: Mucous membranes are moist.      Pharynx: Oropharynx is clear. No posterior oropharyngeal erythema.   Eyes:      General:         Right eye: No discharge.         Left eye: No discharge.   Cardiovascular:      Rate and Rhythm: Normal rate and regular rhythm.      Heart sounds: Normal heart sounds.   Pulmonary:      Effort: Pulmonary effort is normal.      Breath sounds: Normal breath sounds. No wheezing or rhonchi.   Skin:     General: Skin is warm.      Findings: No erythema or rash.   Neurological:      General: No focal deficit present.      Mental Status: She is alert. Mental status is at baseline.   Psychiatric:         Mood and Affect: Mood normal.         Behavior: Behavior normal.       ASSESSMENT/PLAN:  Lorie Rosales is a 42 year old female seen today for ongoing evaluation of asthma.  Another consideration is dysfunctional breathing syndrome.  However she does have an elevated IgE as  well as elevated eosinophilia consistent with an asthma diagnosis with positive skin test to trees and weeds.  She does have allergic rhinitis.  Biggest problem is the sensation of difficulty breathing into her lungs.    She will continue with Trelegy 1 puff daily.  As in the past, when her symptoms improved she can stop Trelegy.  She will continue with Xopenex 2 puffs every 4 hours as needed.    She follows up with pulmonology in February.    I do not feel allergy shots are necessary.  I do not feel Biologics are necessary at this time.      I was surprised by the lack of improvement with prednisone and Trelegy.  I do question if there is alternative diagnoses contributing to her symptoms.  At this time she prefers not to do any further evaluation.    Follow-up as needed.     Thank you for allowing me to participate in the care of Lorie Rosales.      A total of 20 minutes was spent on the day of the encounter performing chart review, history and exam, documentation, and counseling and coordination of care as noted above.       Adrian Jean MD  Allergy/Immunology  [facility]        Again, thank you for allowing me to participate in the care of your patient.        Sincerely,        Adrian Jean MD

## 2022-09-23 NOTE — PROGRESS NOTES
Lorie Rosales was seen in the Allergy Clinic at [facility].    Lorie Rosales is a 42 year old female being seen today for ongoing evaluation of breathing    Since the last visit the patient has been somewhat better.    Chief Complaint   Patient presents with     Asthma Recheck     Patient following up on breathing symptoms. Since starting Trelegy Ellipta inhaler symptoms have improved slightly.       Lorie is seen today for ongoing evaluation of asthma.  She has seen pulmonology.  She has a lifelong history of having shortness of breath that persist for several months and then resolves on its own.  She describes it as sensation that is difficult to get air in.    She had elevated eosinophilia of 1000 and a repeat after prednisone her eosinophil count dropped to 300 several weeks after stopping prednisone.  IgE was also elevated.  With prednisone she only had partial benefit.  Spirometry was performed by pulmonology and did show some increased residual volume.    Skin testing was positive to trees and weeds.    She does have increased stress lately.     She stopped Advair and started Trelegy had some partial improvement.  Still having some shortness of breath which she describes as difficulty getting air into her lungs.      Past Medical History:   Diagnosis Date     Tension headache     Neuro consult     Vertigo      Family History   Problem Relation Age of Onset     Aneurysm Mother 49        brain     No past surgical history on file.      Current Outpatient Medications:      Fluticasone-Umeclidin-Vilanterol (TRELEGY ELLIPTA) 200-62.5-25 MCG/INH oral inhaler, Inhale 1 puff into the lungs daily, Disp: 1 each, Rfl: 3     levalbuterol (XOPENEX HFA) 45 MCG/ACT inhaler, Inhale 2 puffs into the lungs every 4 hours as needed for shortness of breath / dyspnea or wheezing, Disp: , Rfl:      levonorgestrel-ethinyl estradiol (FALMINA) 0.1-20 MG-MCG tablet, Take 1 tablet by mouth daily, Disp: 84 tablet,  Rfl: 3     fluticasone-salmeterol (ADVAIR) 250-50 MCG/ACT inhaler, Inhale 1 puff into the lungs 2 times daily (Patient not taking: Reported on 9/23/2022), Disp: 1 each, Rfl: 11     predniSONE (DELTASONE) 20 MG tablet, Take 2 tabs daily x 4 days and 1 tab daily x 4 days (Patient not taking: Reported on 9/23/2022), Disp: 12 tablet, Rfl: 0  Allergies   Allergen Reactions     [place] [[employer]]          EXAM:   /84   Pulse 55   Wt 50 kg (110 lb 4.8 oz)   LMP 09/19/2022 (Approximate)   SpO2 99%   BMI 19.42 kg/m      Constitutional:       General: She is not in acute distress.     Appearance: Normal appearance. She is not ill-appearing.   HENT:      Head: Normocephalic and atraumatic.      Nose: Nose normal. No congestion or rhinorrhea.      Mouth/Throat:      Mouth: Mucous membranes are moist.      Pharynx: Oropharynx is clear. No posterior oropharyngeal erythema.   Eyes:      General:         Right eye: No discharge.         Left eye: No discharge.   Cardiovascular:      Rate and Rhythm: Normal rate and regular rhythm.      Heart sounds: Normal heart sounds.   Pulmonary:      Effort: Pulmonary effort is normal.      Breath sounds: Normal breath sounds. No wheezing or rhonchi.   Skin:     General: Skin is warm.      Findings: No erythema or rash.   Neurological:      General: No focal deficit present.      Mental Status: She is alert. Mental status is at baseline.   Psychiatric:         Mood and Affect: Mood normal.         Behavior: Behavior normal.       ASSESSMENT/PLAN:  Lorie Rosales is a 42 year old female seen today for ongoing evaluation of asthma.  Another consideration is dysfunctional breathing syndrome.  However she does have an elevated IgE as well as elevated eosinophilia consistent with an asthma diagnosis with positive skin test to trees and weeds.  She does have allergic rhinitis.  Biggest problem is the sensation of difficulty breathing into her lungs.    She will continue with Trelegy 1  puff daily.  As in the past, when her symptoms improved she can stop Trelegy.  She will continue with Xopenex 2 puffs every 4 hours as needed.    She follows up with pulmonology in February.    I do not feel allergy shots are necessary.  I do not feel Biologics are necessary at this time.      I was surprised by the lack of improvement with prednisone and Trelegy.  I do question if there is alternative diagnoses contributing to her symptoms.  At this time she prefers not to do any further evaluation.    Follow-up as needed.     Thank you for allowing me to participate in the care of Lorie Rosales.      A total of 20 minutes was spent on the day of the encounter performing chart review, history and exam, documentation, and counseling and coordination of care as noted above.       Adrian Jean MD  Allergy/Immunology  [facility]

## 2023-02-02 ENCOUNTER — OFFICE VISIT (OUTPATIENT)
Dept: PULMONOLOGY | Facility: CLINIC | Age: 43
End: 2023-02-02
Attending: PHYSICIAN ASSISTANT
Payer: COMMERCIAL

## 2023-02-02 VITALS — DIASTOLIC BLOOD PRESSURE: 76 MMHG | HEART RATE: 76 BPM | SYSTOLIC BLOOD PRESSURE: 124 MMHG | OXYGEN SATURATION: 96 %

## 2023-02-02 DIAGNOSIS — J45.40 MODERATE PERSISTENT ASTHMA, UNSPECIFIED WHETHER COMPLICATED: ICD-10-CM

## 2023-02-02 DIAGNOSIS — J45.990 EXERCISE-INDUCED ASTHMA: Primary | ICD-10-CM

## 2023-02-02 LAB — PULMONARY FUNCTION TEST-FENO: 7 PPB (ref 0–40)

## 2023-02-02 PROCEDURE — 99212 OFFICE O/P EST SF 10 MIN: CPT | Performed by: INTERNAL MEDICINE

## 2023-02-02 PROCEDURE — 95012 NITRIC OXIDE EXP GAS DETER: CPT | Performed by: INTERNAL MEDICINE

## 2023-02-02 PROCEDURE — 99214 OFFICE O/P EST MOD 30 MIN: CPT | Performed by: INTERNAL MEDICINE

## 2023-02-02 ASSESSMENT — PAIN SCALES - GENERAL: PAINLEVEL: NO PAIN (0)

## 2023-02-02 NOTE — PROGRESS NOTES
[facility] Disease [facility]    Reason for Visit  Lorie Rosales is a 41 year old year old female who is being seen for Follow Up (6 month follow up)    HPI from previous visit   Patient is 41 years old female with past medical history of exercise-induced dyspnea since she was a teenager.  She has never received an asthma diagnosis but Advair 100 was tried with 40% improvement in symptoms.  She had spirometry back in [place] [several years ago] and was told that it was normal.  Her dyspnea is triggered by running and sometimes after talking she would feel she cannot take a deep breath in.  Her symptoms are more prominent in late spring and early summer. She denies subjective wheezing, cough, sputum production, nocturnal symptoms, nasal symptoms, sinus symptoms, or symptoms to suggest an allergic reaction.  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  There is no symptomatic variation with occupation.  She was prescribed also albuterol as needed and several years ago but does not take it due to dizziness.  She is a lifetime non-smoker.  No history of ER visits or urgent care visits for shortness of breath.    Updates from today 2/2/23  Patient returns for follow up. Has seen Allergy clinic for IgE 500+ and peripheral eosinophilia of 1000, allergy workup completed and felt unlikely to need biologics or allergy shots. Her inhaler was changed from advair to trelegy and she did feel improvement. Now in winter, she is no longer symptomatic, so she stopped taking trelegy. Denies nocturnal symptoms or need for xopones. Has noticed with stressful events related to work that her asthma tends to flare.       Vitals: /76   Pulse 76   SpO2 96%     Exam:   GENERAL APPEARANCE: Well developed, well nourished, alert, and in no apparent distress.  RESP: good air flow throughout.  No crackles. No rhonchi. No wheezes.  CV: Normal S1, S2, regular rhythm, normal rate. No murmur.  No LE edema.    MS: extremities normal. No clubbing. No cyanosis.  SKIN: no rash on limited exam.  NEURO: Mentation intact, speech normal, normal gait and stance.  PSYCH: mentation appears normal. and affect normal/bright.    Results:  PFTs 8/4/22      FeNO today 2/2/23: 7 ppb      Chest imaging:  Reviewed CXR images from 8/2022  Unchanged mild hyperinflation  Scoliosis with hardware       Assessment and plan:   1.  Exercise-induced asthma  Patient is 42 years old female with exertional dyspnea since being a teenager with constellation of historic clues, radiologic and PFT findings to suggest exercise-induced asthma.  She also has seasonal variation.  Although her spirometry shows no obstruction, she does have significant air trapping [].  Since this is likely exercise-induced asthma, obstruction would not show up on spirometry unless exercise PFTs are performed.  She does have significant symptomatic response to Advair and seen allergy recently, who changed it to trelegy with improved symptom control. Now in winter, she is no longer symptomatic so she stopped trelegy.   Plan:  - May resume trelegy in spring, summer and fall, ok to not take during witner  - Xoponex whenever stress/anxeity levels are perceived to increase, since she has correlated this with asthma flares  - Xoponex 2 puffs, 15 mins prior to exercise  - Exhaled nitric oxide with next visit  - CBC with diff in summer r/o recurrent eosinophilia  - Recheck IgE in summer         Return to clinic in 6 months

## 2023-02-02 NOTE — LETTER
2/2/2023         RE: Lorie Rosales  [address]  [place] [address]        Dear Colleague,    Thank you for referring your patient, Lorie Rosales, to the [facility] [place]. Please see a copy of my visit note below.    [facility] Pulmonary Disease Clinic    Reason for Visit  Lorie Rosales is a 41 year old year old female who is being seen for Follow Up (6 month follow up)    HPI from previous visit   Patient is 41 years old female with past medical history of exercise-induced dyspnea since she was a teenager.  She has never received an asthma diagnosis but Advair 100 was tried with 40% improvement in symptoms.  She had spirometry back in [place] [several years ago] and was told that it was normal.  Her dyspnea is triggered by running and sometimes after talking she would feel she cannot take a deep breath in.  Her symptoms are more prominent in late spring and early summer. She denies subjective wheezing, cough, sputum production, nocturnal symptoms, nasal symptoms, sinus symptoms, or symptoms to suggest an allergic reaction.  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  There is no symptomatic variation with occupation.  She was prescribed also albuterol as needed and several years ago but does not take it due to dizziness.  She is a lifetime non-smoker.  No history of ER visits or urgent care visits for shortness of breath.    Updates from today 2/2/23  Patient returns for follow up. Has seen Allergy clinic for IgE 500+ and peripheral eosinophilia of 1000, allergy workup completed and felt unlikely to need biologics or allergy shots. Her inhaler was changed from advair to trelegy and she did feel improvement. Now in winter, she is no longer symptomatic, so she stopped taking trelegy. Denies nocturnal symptoms or need for xopones. Has noticed with stressful events related to work that her asthma tends to flare.        Vitals: /76   Pulse 76   SpO2 96%     Exam:   GENERAL APPEARANCE: Well developed, well nourished, alert, and in no apparent distress.  RESP: good air flow throughout.  No crackles. No rhonchi. No wheezes.  CV: Normal S1, S2, regular rhythm, normal rate. No murmur.  No LE edema.   MS: extremities normal. No clubbing. No cyanosis.  SKIN: no rash on limited exam.  NEURO: Mentation intact, speech normal, normal gait and stance.  PSYCH: mentation appears normal. and affect normal/bright.    Results:  PFTs 8/4/22      FeNO today 2/2/23: 7 ppb      Chest imaging:  Reviewed CXR images from 8/2022  Unchanged mild hyperinflation  Scoliosis with hardware       Assessment and plan:   1.  Exercise-induced asthma  Patient is 42 years old female with exertional dyspnea since being a teenager with constellation of historic clues, radiologic and PFT findings to suggest exercise-induced asthma.  She also has seasonal variation.  Although her spirometry shows no obstruction, she does have significant air trapping [].  Since this is likely exercise-induced asthma, obstruction would not show up on spirometry unless exercise PFTs are performed.  She does have significant symptomatic response to Advair and seen allergy recently, who changed it to trelegy with improved symptom control. Now in winter, she is no longer symptomatic so she stopped trelegy.   Plan:  - May resume trelegy in spring, summer and fall, ok to not take during witner  - Xoponex whenever stress/anxeity levels are perceived to increase, since she has correlated this with asthma flares  - Xoponex 2 puffs, 15 mins prior to exercise  - Exhaled nitric oxide with next visit  - CBC with diff in summer r/o recurrent eosinophilia  - Recheck IgE in summer     Return to clinic in 6 months       Again, thank you for allowing me to participate in the care of your patient.        Sincerely,        Margy Roldan MD

## 2023-02-02 NOTE — PATIENT INSTRUCTIONS
Use xoponex, two puffs, 15 minutes prior to exercise     Use Trelegy in seasons known to flare your asthma, namely summer and fall, also spring. May skip in winter if symptoms are well controlled.

## 2023-02-02 NOTE — NURSING NOTE
Chief Complaint   Patient presents with     Follow Up     6 month follow up     Vitals were taken and medications were reconciled.     Trudy Landeros RMA  1:58 PM

## 2023-03-27 ENCOUNTER — TELEPHONE (OUTPATIENT)
Dept: PULMONOLOGY | Facility: CLINIC | Age: 43
End: 2023-03-27
Payer: COMMERCIAL

## 2023-03-27 NOTE — TELEPHONE ENCOUNTER
Left Voicemail (1st Attempt) for the patient to call back and schedule the following:    Appointment type: RTN  Provider: YANG  Return date: NEXT AVAILABLE  Specialty phone number: [phone]  Additional appointment(s) needed: PFT, LAB  Additonal Notes: RESCHEDULE NEEDED.

## 2023-04-04 NOTE — TELEPHONE ENCOUNTER
Left Voicemail (2nd Attempt) for the patient to call back and schedule the following:    Appointment type: RTN  Provider: YANG  Return date: AUGUST 2023  Specialty phone number: [phone]  Additional appointment(s) needed: PFT, LAB  Additonal Notes: RESCHEDULE NEEDED. APPTS CANCELLED.

## 2023-05-03 ENCOUNTER — OFFICE VISIT (OUTPATIENT)
Dept: FAMILY MEDICINE | Facility: CLINIC | Age: 43
End: 2023-05-03
Payer: COMMERCIAL

## 2023-05-03 VITALS
OXYGEN SATURATION: 96 % | HEIGHT: 63 IN | DIASTOLIC BLOOD PRESSURE: 80 MMHG | BODY MASS INDEX: 19.14 KG/M2 | HEART RATE: 84 BPM | TEMPERATURE: 98.3 F | WEIGHT: 108 LBS | RESPIRATION RATE: 15 BRPM | SYSTOLIC BLOOD PRESSURE: 121 MMHG

## 2023-05-03 DIAGNOSIS — Z00.00 ROUTINE GENERAL MEDICAL EXAMINATION AT A HEALTH CARE FACILITY: Primary | ICD-10-CM

## 2023-05-03 DIAGNOSIS — R00.2 PALPITATIONS: ICD-10-CM

## 2023-05-03 DIAGNOSIS — Z13.220 LIPID SCREENING: ICD-10-CM

## 2023-05-03 DIAGNOSIS — R53.83 OTHER FATIGUE: ICD-10-CM

## 2023-05-03 DIAGNOSIS — Z11.59 NEED FOR HEPATITIS B SCREENING TEST: ICD-10-CM

## 2023-05-03 DIAGNOSIS — H91.93 BILATERAL CHANGE IN HEARING: ICD-10-CM

## 2023-05-03 DIAGNOSIS — R73.03 PRE-DIABETES: ICD-10-CM

## 2023-05-03 DIAGNOSIS — Z13.1 SCREENING FOR DIABETES MELLITUS: ICD-10-CM

## 2023-05-03 DIAGNOSIS — E78.1 HIGH TRIGLYCERIDES: ICD-10-CM

## 2023-05-03 DIAGNOSIS — E55.9 AVITAMINOSIS D: ICD-10-CM

## 2023-05-03 LAB
ALBUMIN SERPL BCG-MCNC: 4.9 G/DL (ref 3.5–5.2)
ALP SERPL-CCNC: 59 U/L (ref 35–104)
ALT SERPL W P-5'-P-CCNC: 8 U/L (ref 10–35)
ANION GAP SERPL CALCULATED.3IONS-SCNC: 13 MMOL/L (ref 7–15)
AST SERPL W P-5'-P-CCNC: 18 U/L (ref 10–35)
BILIRUB SERPL-MCNC: 0.5 MG/DL
BUN SERPL-MCNC: 19 MG/DL (ref 6–20)
CALCIUM SERPL-MCNC: 9.5 MG/DL (ref 8.6–10)
CHLORIDE SERPL-SCNC: 102 MMOL/L (ref 98–107)
CHOLEST SERPL-MCNC: 185 MG/DL
CREAT SERPL-MCNC: 0.68 MG/DL (ref 0.51–0.95)
DEPRECATED CALCIDIOL+CALCIFEROL SERPL-MC: 18 UG/L (ref 20–75)
DEPRECATED HCO3 PLAS-SCNC: 26 MMOL/L (ref 22–29)
ERYTHROCYTE [DISTWIDTH] IN BLOOD BY AUTOMATED COUNT: 11.8 % (ref 10–15)
FERRITIN SERPL-MCNC: 118 NG/ML (ref 6–175)
GFR SERPL CREATININE-BSD FRML MDRD: >90 ML/MIN/1.73M2
GLUCOSE SERPL-MCNC: 94 MG/DL (ref 70–99)
HBA1C MFR BLD: 5.3 % (ref 0–5.6)
HBV CORE AB SERPL QL IA: NONREACTIVE
HBV SURFACE AB SERPL IA-ACNC: 48.35 M[IU]/ML
HBV SURFACE AB SERPL IA-ACNC: REACTIVE M[IU]/ML
HBV SURFACE AG SERPL QL IA: NONREACTIVE
HCT VFR BLD AUTO: 42.7 % (ref 35–47)
HDLC SERPL-MCNC: 75 MG/DL
HGB BLD-MCNC: 14.1 G/DL (ref 11.7–15.7)
IRON BINDING CAPACITY (ROCHE): 338 UG/DL (ref 240–430)
IRON SATN MFR SERPL: 31 % (ref 15–46)
IRON SERPL-MCNC: 105 UG/DL (ref 37–145)
LDLC SERPL CALC-MCNC: 96 MG/DL
MCH RBC QN AUTO: 30.4 PG (ref 26.5–33)
MCHC RBC AUTO-ENTMCNC: 33 G/DL (ref 31.5–36.5)
MCV RBC AUTO: 92 FL (ref 78–100)
NONHDLC SERPL-MCNC: 110 MG/DL
PLATELET # BLD AUTO: 307 10E3/UL (ref 150–450)
POTASSIUM SERPL-SCNC: 3.9 MMOL/L (ref 3.4–5.3)
PROT SERPL-MCNC: 7.5 G/DL (ref 6.4–8.3)
RBC # BLD AUTO: 4.64 10E6/UL (ref 3.8–5.2)
SODIUM SERPL-SCNC: 141 MMOL/L (ref 136–145)
TRIGL SERPL-MCNC: 69 MG/DL
WBC # BLD AUTO: 5.5 10E3/UL (ref 4–11)

## 2023-05-03 PROCEDURE — 80061 LIPID PANEL: CPT | Performed by: FAMILY MEDICINE

## 2023-05-03 PROCEDURE — 82306 VITAMIN D 25 HYDROXY: CPT | Performed by: FAMILY MEDICINE

## 2023-05-03 PROCEDURE — 86704 HEP B CORE ANTIBODY TOTAL: CPT | Performed by: FAMILY MEDICINE

## 2023-05-03 PROCEDURE — 80053 COMPREHEN METABOLIC PANEL: CPT | Performed by: FAMILY MEDICINE

## 2023-05-03 PROCEDURE — 99396 PREV VISIT EST AGE 40-64: CPT | Mod: 25 | Performed by: FAMILY MEDICINE

## 2023-05-03 PROCEDURE — 82728 ASSAY OF FERRITIN: CPT | Performed by: FAMILY MEDICINE

## 2023-05-03 PROCEDURE — 87340 HEPATITIS B SURFACE AG IA: CPT | Performed by: FAMILY MEDICINE

## 2023-05-03 PROCEDURE — 83540 ASSAY OF IRON: CPT | Performed by: FAMILY MEDICINE

## 2023-05-03 PROCEDURE — 83036 HEMOGLOBIN GLYCOSYLATED A1C: CPT | Performed by: FAMILY MEDICINE

## 2023-05-03 PROCEDURE — 91312 COVID-19 BIVALENT 12+ (PFIZER): CPT | Performed by: FAMILY MEDICINE

## 2023-05-03 PROCEDURE — 36415 COLL VENOUS BLD VENIPUNCTURE: CPT | Performed by: FAMILY MEDICINE

## 2023-05-03 PROCEDURE — 86706 HEP B SURFACE ANTIBODY: CPT | Performed by: FAMILY MEDICINE

## 2023-05-03 PROCEDURE — 99213 OFFICE O/P EST LOW 20 MIN: CPT | Mod: 25 | Performed by: FAMILY MEDICINE

## 2023-05-03 PROCEDURE — 83550 IRON BINDING TEST: CPT | Performed by: FAMILY MEDICINE

## 2023-05-03 PROCEDURE — 85027 COMPLETE CBC AUTOMATED: CPT | Performed by: FAMILY MEDICINE

## 2023-05-03 PROCEDURE — 0124A PR ADMIN COVID VAC PFIZER 12+ BIVAL ADDITIONAL: CPT | Performed by: FAMILY MEDICINE

## 2023-05-03 ASSESSMENT — ENCOUNTER SYMPTOMS
DIARRHEA: 0
PALPITATIONS: 1
FREQUENCY: 0
SHORTNESS OF BREATH: 1
JOINT SWELLING: 0
EYE PAIN: 0
DYSURIA: 0
HEMATURIA: 0
HEADACHES: 1
WEAKNESS: 0
CONSTIPATION: 0
MYALGIAS: 0
NERVOUS/ANXIOUS: 0
ABDOMINAL PAIN: 0
COUGH: 0
ARTHRALGIAS: 0
NAUSEA: 0
HEARTBURN: 0
HEMATOCHEZIA: 0
FEVER: 0
CHILLS: 0
BREAST MASS: 0
DIZZINESS: 1
SORE THROAT: 0
PARESTHESIAS: 0

## 2023-05-03 ASSESSMENT — ASTHMA QUESTIONNAIRES: ACT_TOTALSCORE: 23

## 2023-05-03 ASSESSMENT — PAIN SCALES - GENERAL: PAINLEVEL: NO PAIN (0)

## 2023-05-03 NOTE — PROGRESS NOTES
SUBJECTIVE:   CC: Lorie is an 42 year old who presents for preventive health visit.       5/3/2023    11:00 AM   Additional Questions   Roomed by Tish DOW   Patient has been advised of split billing requirements and indicates understanding: Yes  Healthy Habits:     Getting at least 3 servings of Calcium per day:  Yes    Bi-annual eye exam:  Yes    Dental care twice a year:  Yes    Sleep apnea or symptoms of sleep apnea:  None    Diet:  Regular (no restrictions)    Frequency of exercise:  1 day/week    Duration of exercise:  Less than 15 minutes    Taking medications regularly:  Yes    Medication side effects:  None    PHQ-2 Total Score: 0    Additional concerns today:  Yes    Fatigue - Unsure when symptoms started, maybe this year. She is more tired then usual. No naps during the day. she is able to complete day to day tasks. She feels way more fatigued. She sleeps great. No snoring or apnea. Appetite normal. Mood great. She has regular menstrual cycles. 4-5 day cycles, it can be heavy the 2nd day. No recent travel, will travel to [facility] soon. No OTC supplements.      Hearing test - overall having difficulty hearing and wanted to get hearing test.       Today's PHQ-2 Score:       5/3/2023    10:54 AM   PHQ-2 ( 1999 [employer])   Q1: Little interest or pleasure in doing things 0   Q2: Feeling down, depressed or hopeless 0   PHQ-2 Score 0   Q1: Little interest or pleasure in doing things Not at all   Q2: Feeling down, depressed or hopeless Not at all   PHQ-2 Score 0           Social History     Tobacco Use    Smoking status: Never    Smokeless tobacco: Never   Vaping Use    Vaping status: Never Used   Substance Use Topics    Alcohol use: No             5/3/2023    10:54 AM   Alcohol Use   Prescreen: >3 drinks/day or >7 drinks/week? Not Applicable          View : No data to display.              Reviewed orders with patient.  Reviewed health maintenance and updated orders accordingly - Yes      Breast Cancer  Screenin/16/2022    10:18 AM 2022     2:05 PM   Breast CA Risk Assessment (FHS-7)   Do you have a family history of breast, colon, or ovarian cancer? No / Unknown No / Unknown           Pertinent mammograms are reviewed under the imaging tab.    History of abnormal Pap smear: NO - age 30-65 PAP every 5 years with negative HPV co-testing recommended      Latest Ref Rng & Units 3/20/2019     9:10 AM 3/20/2019     9:00 AM   PAP / HPV   PAP (Historical)  NIL      HPV 16 DNA NEG^Negative  Negative     HPV 18 DNA NEG^Negative  Negative     Other HR HPV NEG^Negative  Negative       Reviewed and updated as needed this visit by clinical staff   Tobacco  Allergies  Meds              Reviewed and updated as needed this visit by Provider                     Review of Systems   Constitutional: Negative for chills and fever.   HENT: Positive for hearing loss. Negative for congestion, ear pain and sore throat.    Eyes: Positive for visual disturbance. Negative for pain.   Respiratory: Positive for shortness of breath. Negative for cough.    Cardiovascular: Positive for palpitations. Negative for chest pain and peripheral edema.   Gastrointestinal: Negative for abdominal pain, constipation, diarrhea, heartburn, hematochezia and nausea.   Breasts:  Negative for tenderness, breast mass and discharge.   Genitourinary: Positive for vaginal discharge. Negative for dysuria, frequency, genital sores, hematuria, pelvic pain, urgency and vaginal bleeding.   Musculoskeletal: Negative for arthralgias, joint swelling and myalgias.   Skin: Negative for rash.   Neurological: Positive for dizziness and headaches. Negative for weakness and paresthesias.   Psychiatric/Behavioral: Negative for mood changes. The patient is not nervous/anxious.      She sometimes does get palpitations, sometimes she feels that it is beating really fast. She has had symptoms for years. Sometimes she feels lightheaded with the symptoms. No previous  "cardiology work up.     Normal vaginal discharge.     Vertigo/headache - previously saw neurology and Brain MRI showed no acute changes      OBJECTIVE:   Physical Exam   /80 (BP Location: Right arm, Patient Position: Sitting, Cuff Size: Adult Small)   Pulse 84   Temp 98.3  F (36.8  C) (Oral)   Resp 15   Ht 1.6 m (5' 3\")   Wt 49 kg (108 lb)   LMP 05/01/2023 (Exact Date)   SpO2 96%   BMI 19.13 kg/m            ASSESSMENT/PLAN:     1. Routine general medical examination at a health care facility    2. Other fatigue  - ordered below for further evaluation; tx as indicated   - CBC with platelets; Future  - Iron and iron binding capacity; Future  - Ferritin; Future  - Vitamin D Deficiency; Future  - Comprehensive metabolic panel (BMP + Alb, Alk Phos, ALT, AST, Total. Bili, TP); Future  - CBC with platelets  - Iron and iron binding capacity  - Ferritin  - Vitamin D Deficiency  - Comprehensive metabolic panel (BMP + Alb, Alk Phos, ALT, AST, Total. Bili, TP)    3. Palpitations  - Adult Cardiology Eval  Referral; Future    4. Bilateral change in hearing  - Adult Audiology  Referral; Future    5. Lipid screening  - Lipid panel reflex to direct LDL Non-fasting; Future    6. Screening for diabetes mellitus  - Hemoglobin A1c; Future    7. Need for hepatitis B screening test  - Hepatitis B core antibody; Future  - Hepatitis B Surface Antibody; Future  - Hepatitis B surface antigen; Future  - Hepatitis B core antibody  - Hepatitis B Surface Antibody  - Hepatitis B surface antigen    8. High triglycerides  - Lipid panel reflex to direct LDL Fasting    9. Pre-diabetes  - Hemoglobin A1c    10. Avitaminosis D  - vitamin D low, tx below  - vitamin D2 (ERGOCALCIFEROL) 04080 units (1250 mcg) capsule; Take 1 capsule (50,000 Units) by mouth once a week for 12 doses  Dispense: 12 capsule; Refill: 0     Patient has been advised of split billing requirements and indicates understanding: " Yes      COUNSELING:  Reviewed preventive health counseling, as reflected in patient instructions        She reports that she has never smoked. She has never used smokeless tobacco.          Brock Booker MD  [facility]

## 2023-05-05 ENCOUNTER — MYC MEDICAL ADVICE (OUTPATIENT)
Dept: FAMILY MEDICINE | Facility: CLINIC | Age: 43
End: 2023-05-05

## 2023-05-05 DIAGNOSIS — Z29.89 NEED FOR MALARIA PROPHYLAXIS: Primary | ICD-10-CM

## 2023-05-07 RX ORDER — ERGOCALCIFEROL 1.25 MG/1
50000 CAPSULE, LIQUID FILLED ORAL WEEKLY
Qty: 12 CAPSULE | Refills: 0 | Status: SHIPPED | OUTPATIENT
Start: 2023-05-07 | End: 2023-07-24

## 2023-05-08 NOTE — TELEPHONE ENCOUNTER
RECORDS RECEIVED FROM:    DATE RECEIVED:    NOTES STATUS DETAILS   OFFICE NOTE from referring provider  Internal Dr. Booker 5-3-23   OFFICE NOTE from other cardiologists  N/A    RECORDS from hospital/ED N/A    MEDICATION LIST Internal    GENERAL CARDIO RECORDS   (ALL APPOINTMENT TYPES)     LABS (CBC,BMP,CMP, TSH) Internal    EKG (STRIPS & REPORTS) N/A    MONITORS (STRIPS & REPORTS) N/A    ECHOS (IMAGES AND REPORTS) N/A    STRESS TESTS (IMAGES AND REPORTS) N/A    cMRI (IMAGES AND REPORTS) N/A    CT/CTA (IMAGES AND REPORTS) N/A

## 2023-05-08 NOTE — TELEPHONE ENCOUNTER
Dr. Booker-Please review and sign if agree.    Thank you!  VAL ClarosN, RN-BC  [facility]

## 2023-05-09 NOTE — PROGRESS NOTES
Reason for Visit:  Today I have seen Lorie Rosales for palpitations  Consult: Yes    HPI : Status / Symptoms / Concerns     41 y/o F with h/o asthma, back surgery, and no cardiac history who presents for evaluation of palpitations. She tells me that the palpitations have been going on for years. They occur about once a month, sometimes more often. The palpitations come on without any warning sign, and often break when she coughs. She has no associated lightheadedness or shortness of breath.     Cardiovascular risk factor profile:   (-) HTN, (-) DM, (-) hypercholesterolemia, (-)  prior tobacco use, (-) fam Hx premature CAD.     Chest Pain:   No  Shortness of Breath:  No  Ankle Swelling:  No  Muscle Aches:  No  Palpitations:   Yes     Lightheadedness:  No  Fainting:   No  Medication Issues:  No  Recent Test:  No    Past Medical History:   Diagnosis Date     Tension headache     Neuro consult     Vertigo       No past surgical history on file.     Other Systems:  Resp - / GI - /MS - /Adama - /Psy - /Derm - /Hem - / - /Lymph - /ENT -/ Endo -  No other pertinent concern in systems review.     Social History: reports that she has never smoked. She has never used smokeless tobacco. She reports that she does not drink alcohol and does not use drugs.   I have reviewed this patient's social history and updated it with pertinent information if needed.    Family History:  She indicated that her mother is alive. She indicated that her father is alive. She indicated that her brother is alive.     Family History   Problem Relation Age of Onset     Aneurysm Mother 49        brain       Medications:  Current Outpatient Medications   Medication     levonorgestrel-ethinyl estradiol (FALMINA) 0.1-20 MG-MCG tablet     vitamin D2 (ERGOCALCIFEROL) 19897 units (1250 mcg) capsule     Fluticasone-Umeclidin-Vilanterol (TRELEGY ELLIPTA) 200-62.5-25 MCG/INH oral inhaler     levalbuterol (XOPENEX HFA) 45 MCG/ACT inhaler     No current  "facility-administered medications for this visit.        Exam:  /77 (BP Location: Right arm, Patient Position: Sitting, Cuff Size: Adult Small)   Pulse 68   Ht 1.612 m (5' 3.47\")   Wt 48.5 kg (107 lb)   LMP 05/01/2023 (Exact Date)   SpO2 94%   BMI 18.68 kg/m     Body mass index is 18.68 kg/m .   General:  Alert, oriented, no acute distress, normal chair rise, walking not impaired   Eyes:  External exam normal, Conjunctivae noninjected and nonicteric.  Mouth:  Moist mucosa, restored teeth  Ears:  Hearing grossly intact  Neck:  No thyromegaly. Carotid upstroke normal, no carotid bruit, no JVD  Lungs:  Clear to auscultation. No wheezes, crackles, rales or rhonchi,      no accessory muscle use   Heart:  Regular, normal S1 and S2, no obvious murmurs, no rubs or gallops  Abdomen: Soft, non-tender  Extremities: No ankle edema, age appropriate skin without stasis  Pulses: Pedal 2+ bilaterally  Adama/Psy: Non-focal, normal mood, normal affect      Vital Trend:  Wt Readings from Last 5 Encounters:   05/10/23 48.5 kg (107 lb)   05/03/23 49 kg (108 lb)   09/23/22 50 kg (110 lb 4.8 oz)   08/25/22 51 kg (112 lb 8 oz)   08/16/22 49.9 kg (110 lb)     BP Readings from Last 5 Encounters:   05/10/23 114/77   05/03/23 121/80   02/02/23 124/76   09/23/22 123/84   08/25/22 97/64     Pulse Readings from Last 5 Encounters:   05/10/23 68   05/03/23 84   02/02/23 76   09/23/22 55   08/25/22 86        Data:     ECG: None      Lab Review:  Lab Results   Component Value Date    CR 0.68 05/03/2023    CR 0.68 08/16/2022    CR 0.66 07/22/2021    CR 0.62 07/23/2020    CR 0.67 04/09/2019    LDL 96 05/03/2023    LDL 66 08/16/2022    LDL 75 07/23/2020    HDL 75 05/03/2023    HDL 72 08/16/2022    HDL 58 07/23/2020    POTASSIUM 3.9 05/03/2023    POTASSIUM 3.3 (L) 08/16/2022    POTASSIUM 3.5 07/22/2021     05/03/2023     08/16/2022     07/22/2021         Assessment:     Lorie Rosales is a 42 year old female with palpitations, " which sound very much like SVT based on her provided history and the reliable termination with vagal maneuvers (I.e. cough). We discussed the benign nature of these palpitations, and discussed additional vagal maneuvers she can try if the palpitations do not break with cough. In addition, we counseled about when to seek medical evaluation, such as when the palpitations persist and do not piedad with vagal maneuvers or if she were to develop associated symptoms. These type of palpitations tend to resolve over time with normal aging.    Plan:     1. Palpitations - likely SVT based on the provided history   -  Provided counseling and reassurance    -  Return if symptoms become more frequent or more bothersome    Contingency Plan: Zio patch, EP referral  Follow-up: as needed    I spent 40min for the chart preparation, visit and documentation   This note was software transcribed.

## 2023-05-09 NOTE — TELEPHONE ENCOUNTER
Responded to the patient through MyChart.     Shanelle Viveros RN  [facility]

## 2023-05-10 ENCOUNTER — PRE VISIT (OUTPATIENT)
Dept: CARDIOLOGY | Facility: CLINIC | Age: 43
End: 2023-05-10

## 2023-05-10 ENCOUNTER — OFFICE VISIT (OUTPATIENT)
Dept: CARDIOLOGY | Facility: CLINIC | Age: 43
End: 2023-05-10
Attending: FAMILY MEDICINE
Payer: COMMERCIAL

## 2023-05-10 VITALS
DIASTOLIC BLOOD PRESSURE: 77 MMHG | SYSTOLIC BLOOD PRESSURE: 114 MMHG | HEART RATE: 68 BPM | OXYGEN SATURATION: 94 % | WEIGHT: 107 LBS | HEIGHT: 63 IN | BODY MASS INDEX: 18.96 KG/M2

## 2023-05-10 DIAGNOSIS — R00.2 PALPITATIONS: ICD-10-CM

## 2023-05-10 PROCEDURE — 99212 OFFICE O/P EST SF 10 MIN: CPT | Performed by: INTERNAL MEDICINE

## 2023-05-10 PROCEDURE — 99204 OFFICE O/P NEW MOD 45 MIN: CPT | Performed by: INTERNAL MEDICINE

## 2023-05-10 ASSESSMENT — PAIN SCALES - GENERAL: PAINLEVEL: NO PAIN (0)

## 2023-05-10 NOTE — LETTER
5/10/2023      RE: Lorie Rosales  [address] [place] [address]       Dear Colleague,    Thank you for the opportunity to participate in the care of your patient, Lorie Rosales, at the [facility] [place] [facility]. Please see a copy of my visit note below.    Reason for Visit:  Today I have seen Lorie Rosales for palpitations  Consult: Yes    HPI : Status / Symptoms / Concerns     43 y/o F with h/o asthma, back surgery, and no cardiac history who presents for evaluation of palpitations. She tells me that the palpitations have been going on for years. They occur about once a month, sometimes more often. The palpitations come on without any warning sign, and often break when she coughs. She has no associated lightheadedness or shortness of breath.     Cardiovascular risk factor profile:   (-) HTN, (-) DM, (-) hypercholesterolemia, (-)  prior tobacco use, (-) fam Hx premature CAD.     Chest Pain:   No  Shortness of Breath:  No  Ankle Swelling:  No  Muscle Aches:  No  Palpitations:   Yes     Lightheadedness:  No  Fainting:   No  Medication Issues:  No  Recent Test:  No    Past Medical History:   Diagnosis Date    Tension headache     Neuro consult    Vertigo       No past surgical history on file.     Other Systems:  Resp - / GI - /MS - /Adama - /Psy - /Derm - /Hem - / - /Lymph - /ENT -/ Endo -  No other pertinent concern in systems review.     Social History: reports that she has never smoked. She has never used smokeless tobacco. She reports that she does not drink alcohol and does not use drugs.   I have reviewed this patient's social history and updated it with pertinent information if needed.    Family History:  She indicated that her mother is alive. She indicated that her father is alive. She indicated that her brother is alive.     Family History   Problem Relation Age of Onset    Aneurysm Mother 49        brain  "      Medications:  Current Outpatient Medications   Medication    levonorgestrel-ethinyl estradiol (FALMINA) 0.1-20 MG-MCG tablet    vitamin D2 (ERGOCALCIFEROL) 41779 units (1250 mcg) capsule    Fluticasone-Umeclidin-Vilanterol (TRELEGY ELLIPTA) 200-62.5-25 MCG/INH oral inhaler    levalbuterol (XOPENEX HFA) 45 MCG/ACT inhaler     No current facility-administered medications for this visit.        Exam:  /77 (BP Location: Right arm, Patient Position: Sitting, Cuff Size: Adult Small)   Pulse 68   Ht 1.612 m (5' 3.47\")   Wt 48.5 kg (107 lb)   LMP 05/01/2023 (Exact Date)   SpO2 94%   BMI 18.68 kg/m     Body mass index is 18.68 kg/m .   General:  Alert, oriented, no acute distress, normal chair rise, walking not impaired   Eyes:  External exam normal, Conjunctivae noninjected and nonicteric.  Mouth:  Moist mucosa, restored teeth  Ears:  Hearing grossly intact  Neck:  No thyromegaly. Carotid upstroke normal, no carotid bruit, no JVD  Lungs:  Clear to auscultation. No wheezes, crackles, rales or rhonchi,      no accessory muscle use   Heart:  Regular, normal S1 and S2, no obvious murmurs, no rubs or gallops  Abdomen: Soft, non-tender  Extremities: No ankle edema, age appropriate skin without stasis  Pulses: Pedal 2+ bilaterally  Adama/Psy: Non-focal, normal mood, normal affect      Vital Trend:  Wt Readings from Last 5 Encounters:   05/10/23 48.5 kg (107 lb)   05/03/23 49 kg (108 lb)   09/23/22 50 kg (110 lb 4.8 oz)   08/25/22 51 kg (112 lb 8 oz)   08/16/22 49.9 kg (110 lb)     BP Readings from Last 5 Encounters:   05/10/23 114/77   05/03/23 121/80   02/02/23 124/76   09/23/22 123/84   08/25/22 97/64     Pulse Readings from Last 5 Encounters:   05/10/23 68   05/03/23 84   02/02/23 76   09/23/22 55   08/25/22 86        Data:     ECG: None      Lab Review:  Lab Results   Component Value Date    CR 0.68 05/03/2023    CR 0.68 08/16/2022    CR 0.66 07/22/2021    CR 0.62 07/23/2020    CR 0.67 04/09/2019    LDL 96 " 05/03/2023    LDL 66 08/16/2022    LDL 75 07/23/2020    HDL 75 05/03/2023    HDL 72 08/16/2022    HDL 58 07/23/2020    POTASSIUM 3.9 05/03/2023    POTASSIUM 3.3 (L) 08/16/2022    POTASSIUM 3.5 07/22/2021     05/03/2023     08/16/2022     07/22/2021         Assessment:     Lorie Rosales is a 42 year old female with palpitations, which sound very much like SVT based on her provided history and the reliable termination with vagal maneuvers (I.e. cough). We discussed the benign nature of these palpitations, and discussed additional vagal maneuvers she can try if the palpitations do not break with cough. In addition, we counseled about when to seek medical evaluation, such as when the palpitations persist and do not piedad with vagal maneuvers or if she were to develop associated symptoms. These type of palpitations tend to resolve over time with normal aging.    Plan:     1. Palpitations - likely SVT based on the provided history   -  Provided counseling and reassurance    -  Return if symptoms become more frequent or more bothersome    Contingency Plan: Zio patch, EP referral  Follow-up: as needed    I spent 40min for the chart preparation, visit and documentation   This note was software transcribed.      Please do not hesitate to contact me if you have any questions/concerns.     Sincerely,     Raymon Montes MD

## 2023-05-10 NOTE — NURSING NOTE
Chief Complaint   Patient presents with     New Patient     Patient is referred by Brock Booker MD for cardiac evaluation related to history of palpitations       Vitals were taken, medications reconciled.    Mary Parr, EMT   9:37 AM

## 2023-05-10 NOTE — PATIENT INSTRUCTIONS
Dr. Montes recommends:    Follow up as needed.    Thank you for your visit today.  Please call me with any questions or concerns.   Peter Connelly RN  Cardiology Care Coordinator  [phone]

## 2023-05-12 RX ORDER — ATOVAQUONE AND PROGUANIL HYDROCHLORIDE 250; 100 MG/1; MG/1
1 TABLET, FILM COATED ORAL DAILY
Qty: 19 TABLET | Refills: 0 | Status: SHIPPED | OUTPATIENT
Start: 2023-05-12

## 2023-05-12 NOTE — TELEPHONE ENCOUNTER
Didn't discuss at visit prophylaxis at visit     RN -   Reviewed pt travel dates and sent prescription    1. Need for malaria prophylaxis  - atovaquone-proguanil (MALARONE) 250-100 MG tablet; Take 1 tablet by mouth daily Start 2 days before travel and continue 7 days after return.  Dispense: 19 tablet; Refill: 0

## 2023-05-16 NOTE — TELEPHONE ENCOUNTER
Writer responded via [employer].    VAL ClarosN, RN-BC  [facility]

## 2023-07-17 ENCOUNTER — PATIENT OUTREACH (OUTPATIENT)
Dept: CARE COORDINATION | Facility: CLINIC | Age: 43
End: 2023-07-17

## 2023-07-26 ENCOUNTER — PATIENT OUTREACH (OUTPATIENT)
Dept: CARE COORDINATION | Facility: CLINIC | Age: 43
End: 2023-07-26

## 2023-07-31 ENCOUNTER — PATIENT OUTREACH (OUTPATIENT)
Dept: CARE COORDINATION | Facility: CLINIC | Age: 43
End: 2023-07-31

## 2023-08-08 NOTE — PROGRESS NOTES
AUDIOLOGY REPORT    SUBJECTIVE:  Lorie Rosales is a 42 year old female who was seen on 8/17/23 in the Audiology Clinic at the [facility] for audiologic evaluation, referred by Brock Booker M.D. Lorie has concerns about her hearing as she has been asking for repetition more frequently than in the past.  The patient previously had a hearing evaluation on 6/10/16 at [facility] which showed normal hearing in both ears.  The work up was done at that time due to the patient having dizziness episodes in 2016-19.  Lorie reports no current dizziness but is occasionally lightheaded.  The patient denies tinnitus, ear pain, drainage from the ears, aural fullness, history of ear surgery, or history of noise exposure.      OBJECTIVE:  Abuse Screening:  Do you feel unsafe at home or work/school? No  Do you feel threatened by someone? No  Does anyone try to keep you from having contact with others, or doing things outside of your home? No  Physical signs of abuse present? No     Fall Risk Screen:  1. Have you fallen two or more times in the past year? No  2. Have you fallen and had an injury in the past year? No    Timed Up and Go Score (in seconds): not tested  Is patient a fall risk? No  Referral initiated: No  Fall Risk Assessment Completed by Audiology    Otoscopic exam indicates a small amount of non-occluding cerumen in both ears.      Pure Tone Thresholds assessed using conventional audiometry with good reliability from 250-8000 Hz bilaterally using insert earphones and circumaural headphones.      RIGHT: Normal hearing    LEFT: Normal hearing     Tympanogram:    RIGHT: Slightly shallow    LEFT:   normal eardrum mobility    Reflexes (reported by stimulus ear):  RIGHT: Ipsilateral is present at normal levels  RIGHT: Contralateral is present at elevated levels   LEFT:   Ipsilateral is present at normal levels  LEFT:   Contralateral is absent at frequencies  tested    Speech Reception Threshold:    RIGHT: 10 dB HL    LEFT:   10 dB HL  Word Recognition Score:     RIGHT: 100% at 50 dB HL using NU-6 recorded word list.    LEFT:   100% at 50 dB HL using NU-6 recorded word list.      ASSESSMENT:   Normal hearing in both ears (no change from 2016 testing at [facility])  Essentially normal middle ear function bilaterally     Today s results were discussed with the patient in detail.     PLAN:    Recheck hearing if changes are noted.  Follow up with PCP regarding occasional lightheadedness.      The patient expressed understanding and agreement with this plan.    Vianey Guillaume, CCC-A, [facility]  Licensed Audiologist  [place] #[id number]    Enclosure: audiogram    Cc Brock Booker M.D.

## 2023-08-17 ENCOUNTER — OFFICE VISIT (OUTPATIENT)
Dept: AUDIOLOGY | Facility: CLINIC | Age: 43
End: 2023-08-17
Payer: COMMERCIAL

## 2023-08-17 DIAGNOSIS — R42 DIZZINESS AND GIDDINESS: Primary | ICD-10-CM

## 2023-08-17 DIAGNOSIS — H91.93 BILATERAL CHANGE IN HEARING: ICD-10-CM

## 2023-08-17 DIAGNOSIS — H91.93 HEARING DIFFICULTY, BILATERAL: ICD-10-CM

## 2023-08-17 PROCEDURE — 92550 TYMPANOMETRY & REFLEX THRESH: CPT | Performed by: AUDIOLOGIST-HEARING AID FITTER

## 2023-08-17 PROCEDURE — 92557 COMPREHENSIVE HEARING TEST: CPT | Performed by: AUDIOLOGIST-HEARING AID FITTER

## 2023-08-23 ENCOUNTER — PATIENT OUTREACH (OUTPATIENT)
Dept: CARE COORDINATION | Facility: CLINIC | Age: 43
End: 2023-08-23

## 2023-10-06 DIAGNOSIS — Z30.41 ENCOUNTER FOR SURVEILLANCE OF CONTRACEPTIVE PILLS: ICD-10-CM

## 2023-10-06 RX ORDER — LEVONORGESTREL/ETHIN.ESTRADIOL 0.1-0.02MG
1 TABLET ORAL DAILY
Qty: 84 TABLET | Refills: 1 | Status: SHIPPED | OUTPATIENT
Start: 2023-10-06 | End: 2024-04-08

## 2023-11-09 ENCOUNTER — IMMUNIZATION (OUTPATIENT)
Dept: NURSING | Facility: CLINIC | Age: 43
End: 2023-11-09
Payer: COMMERCIAL

## 2023-11-09 PROCEDURE — 91320 SARSCV2 VAC 30MCG TRS-SUC IM: CPT

## 2023-11-09 PROCEDURE — 90686 IIV4 VACC NO PRSV 0.5 ML IM: CPT

## 2023-11-09 PROCEDURE — 90480 ADMN SARSCOV2 VAC 1/ONLY CMP: CPT

## 2023-11-09 PROCEDURE — 90471 IMMUNIZATION ADMIN: CPT

## 2023-12-17 ENCOUNTER — HEALTH MAINTENANCE LETTER (OUTPATIENT)
Age: 43
End: 2023-12-17

## 2024-01-04 ENCOUNTER — ANCILLARY PROCEDURE (OUTPATIENT)
Dept: MAMMOGRAPHY | Facility: CLINIC | Age: 44
End: 2024-01-04
Attending: FAMILY MEDICINE
Payer: COMMERCIAL

## 2024-01-04 DIAGNOSIS — Z12.31 VISIT FOR SCREENING MAMMOGRAM: ICD-10-CM

## 2024-01-04 PROCEDURE — 77067 SCR MAMMO BI INCL CAD: CPT | Mod: GC

## 2024-04-03 ENCOUNTER — PATIENT OUTREACH (OUTPATIENT)
Dept: CARE COORDINATION | Facility: CLINIC | Age: 44
End: 2024-04-03
Payer: COMMERCIAL

## 2024-04-07 DIAGNOSIS — Z30.41 ENCOUNTER FOR SURVEILLANCE OF CONTRACEPTIVE PILLS: ICD-10-CM

## 2024-04-08 RX ORDER — LEVONORGESTREL/ETHIN.ESTRADIOL 0.1-0.02MG
1 TABLET ORAL DAILY
Qty: 84 TABLET | Refills: 0 | Status: SHIPPED | OUTPATIENT
Start: 2024-04-08

## 2024-04-08 RX ORDER — LEVONORGESTREL AND ETHINYL ESTRADIOL 0.1-0.02MG
1 KIT ORAL DAILY
Qty: 84 TABLET | Refills: 1 | OUTPATIENT
Start: 2024-04-08

## 2024-04-23 ENCOUNTER — E-VISIT (OUTPATIENT)
Dept: URGENT CARE | Facility: CLINIC | Age: 44
End: 2024-04-23
Payer: COMMERCIAL

## 2024-04-23 DIAGNOSIS — R05.2 SUBACUTE COUGH: Primary | ICD-10-CM

## 2024-04-23 PROCEDURE — 99421 OL DIG E/M SVC 5-10 MIN: CPT | Performed by: EMERGENCY MEDICINE

## 2024-04-23 RX ORDER — BENZONATATE 200 MG/1
200 CAPSULE ORAL 3 TIMES DAILY PRN
Qty: 30 CAPSULE | Refills: 0 | Status: SHIPPED | OUTPATIENT
Start: 2024-04-23

## 2024-04-23 RX ORDER — AZITHROMYCIN 250 MG/1
TABLET, FILM COATED ORAL
Qty: 6 TABLET | Refills: 0 | Status: SHIPPED | OUTPATIENT
Start: 2024-04-23 | End: 2024-04-28

## 2024-04-23 NOTE — PATIENT INSTRUCTIONS
Thank you for choosing us for your care. I have placed an order for a prescription so that you can start treatment. View your full visit summary for details by clicking on the link below. Your pharmacist will able to address any questions you may have about the medication.     If you're not feeling better within 5-7 days, please schedule an appointment.  You can schedule an appointment right here in [facility], or call [phone]  If the visit is for the same symptoms as your eVisit, we'll refund the cost of your eVisit if seen within seven days.

## 2024-06-11 SDOH — HEALTH STABILITY: PHYSICAL HEALTH: ON AVERAGE, HOW MANY MINUTES DO YOU ENGAGE IN EXERCISE AT THIS LEVEL?: 10 MIN

## 2024-06-11 SDOH — HEALTH STABILITY: PHYSICAL HEALTH: ON AVERAGE, HOW MANY DAYS PER WEEK DO YOU ENGAGE IN MODERATE TO STRENUOUS EXERCISE (LIKE A BRISK WALK)?: 1 DAY

## 2024-06-11 ASSESSMENT — ASTHMA QUESTIONNAIRES
ACT_TOTALSCORE: 23
QUESTION_4 LAST FOUR WEEKS HOW OFTEN HAVE YOU USED YOUR RESCUE INHALER OR NEBULIZER MEDICATION (SUCH AS ALBUTEROL): NOT AT ALL
ACT_TOTALSCORE: 23
QUESTION_1 LAST FOUR WEEKS HOW MUCH OF THE TIME DID YOUR ASTHMA KEEP YOU FROM GETTING AS MUCH DONE AT WORK, SCHOOL OR AT HOME: NONE OF THE TIME
QUESTION_5 LAST FOUR WEEKS HOW WOULD YOU RATE YOUR ASTHMA CONTROL: WELL CONTROLLED
QUESTION_2 LAST FOUR WEEKS HOW OFTEN HAVE YOU HAD SHORTNESS OF BREATH: ONCE OR TWICE A WEEK
QUESTION_3 LAST FOUR WEEKS HOW OFTEN DID YOUR ASTHMA SYMPTOMS (WHEEZING, COUGHING, SHORTNESS OF BREATH, CHEST TIGHTNESS OR PAIN) WAKE YOU UP AT NIGHT OR EARLIER THAN USUAL IN THE MORNING: NOT AT ALL

## 2024-06-11 ASSESSMENT — SOCIAL DETERMINANTS OF HEALTH (SDOH): HOW OFTEN DO YOU GET TOGETHER WITH FRIENDS OR RELATIVES?: MORE THAN THREE TIMES A WEEK

## 2024-06-12 ENCOUNTER — OFFICE VISIT (OUTPATIENT)
Dept: FAMILY MEDICINE | Facility: CLINIC | Age: 44
End: 2024-06-12
Payer: COMMERCIAL

## 2024-06-12 VITALS
TEMPERATURE: 97.1 F | DIASTOLIC BLOOD PRESSURE: 84 MMHG | BODY MASS INDEX: 18.96 KG/M2 | RESPIRATION RATE: 15 BRPM | SYSTOLIC BLOOD PRESSURE: 127 MMHG | WEIGHT: 107 LBS | OXYGEN SATURATION: 99 % | HEIGHT: 63 IN

## 2024-06-12 DIAGNOSIS — E55.9 AVITAMINOSIS D: ICD-10-CM

## 2024-06-12 DIAGNOSIS — Z13.220 LIPID SCREENING: ICD-10-CM

## 2024-06-12 DIAGNOSIS — Z23 NEED FOR VACCINATION: ICD-10-CM

## 2024-06-12 DIAGNOSIS — Z78.9 HEPATITIS B IMMUNE: ICD-10-CM

## 2024-06-12 DIAGNOSIS — Z12.4 CERVICAL CANCER SCREENING: ICD-10-CM

## 2024-06-12 DIAGNOSIS — Z12.11 COLON CANCER SCREENING: ICD-10-CM

## 2024-06-12 DIAGNOSIS — R53.83 OTHER FATIGUE: ICD-10-CM

## 2024-06-12 DIAGNOSIS — Z87.898 HISTORY OF PREDIABETES: ICD-10-CM

## 2024-06-12 DIAGNOSIS — Z00.00 ROUTINE GENERAL MEDICAL EXAMINATION AT A HEALTH CARE FACILITY: ICD-10-CM

## 2024-06-12 LAB
ERYTHROCYTE [DISTWIDTH] IN BLOOD BY AUTOMATED COUNT: 11.5 % (ref 10–15)
HBA1C MFR BLD: 5.5 % (ref 0–5.6)
HCT VFR BLD AUTO: 42 % (ref 35–47)
HGB BLD-MCNC: 13.9 G/DL (ref 11.7–15.7)
MCH RBC QN AUTO: 30.5 PG (ref 26.5–33)
MCHC RBC AUTO-ENTMCNC: 33.1 G/DL (ref 31.5–36.5)
MCV RBC AUTO: 92 FL (ref 78–100)
PLATELET # BLD AUTO: 266 10E3/UL (ref 150–450)
RBC # BLD AUTO: 4.56 10E6/UL (ref 3.8–5.2)
WBC # BLD AUTO: 5.6 10E3/UL (ref 4–11)

## 2024-06-12 PROCEDURE — 80061 LIPID PANEL: CPT | Performed by: FAMILY MEDICINE

## 2024-06-12 PROCEDURE — 85027 COMPLETE CBC AUTOMATED: CPT | Performed by: FAMILY MEDICINE

## 2024-06-12 PROCEDURE — 82306 VITAMIN D 25 HYDROXY: CPT | Performed by: FAMILY MEDICINE

## 2024-06-12 PROCEDURE — 80053 COMPREHEN METABOLIC PANEL: CPT | Performed by: FAMILY MEDICINE

## 2024-06-12 PROCEDURE — 90677 PCV20 VACCINE IM: CPT | Performed by: FAMILY MEDICINE

## 2024-06-12 PROCEDURE — 36415 COLL VENOUS BLD VENIPUNCTURE: CPT | Performed by: FAMILY MEDICINE

## 2024-06-12 PROCEDURE — 84443 ASSAY THYROID STIM HORMONE: CPT | Performed by: FAMILY MEDICINE

## 2024-06-12 PROCEDURE — 83036 HEMOGLOBIN GLYCOSYLATED A1C: CPT | Performed by: FAMILY MEDICINE

## 2024-06-12 PROCEDURE — 90471 IMMUNIZATION ADMIN: CPT | Performed by: FAMILY MEDICINE

## 2024-06-12 PROCEDURE — 99213 OFFICE O/P EST LOW 20 MIN: CPT | Mod: 25 | Performed by: FAMILY MEDICINE

## 2024-06-12 PROCEDURE — 87624 HPV HI-RISK TYP POOLED RSLT: CPT | Performed by: FAMILY MEDICINE

## 2024-06-12 PROCEDURE — G0145 SCR C/V CYTO,THINLAYER,RESCR: HCPCS | Performed by: FAMILY MEDICINE

## 2024-06-12 PROCEDURE — 99396 PREV VISIT EST AGE 40-64: CPT | Mod: 25 | Performed by: FAMILY MEDICINE

## 2024-06-12 NOTE — PROGRESS NOTES
Preventive Care Visit  [facility]  Brock Booker MD, Family Medicine  Jun 12, 2024      Assessment & Plan     Routine general medical examination at a health care facility    Avitaminosis D  - Vitamin D Deficiency; Future  - Vitamin D Deficiency  - vitamin D2 (ERGOCALCIFEROL) 02048 units (1250 mcg) capsule; Take 1 capsule (50,000 Units) by mouth once a week for 12 doses  - follow up in three months to recheck labs     Other fatigue  - d/d anemia vs vitamin D deficiency vs thyroid etiology vs jet lag from recent trip vs other  - ordered below for further evaluation; tx as indicated   - CBC with platelets; Future  - Comprehensive metabolic panel (BMP + Alb, Alk Phos, ALT, AST, Total. Bili, TP); Future  - TSH with free T4 reflex; Future  - CBC with platelets  - Comprehensive metabolic panel (BMP + Alb, Alk Phos, ALT, AST, Total. Bili, TP)  - TSH with free T4 reflex    Hepatitis B immune    Cervical cancer screening  - Pap Screen with HPV - Recommended Age 30 - 65 Years    Lipid screening  - Lipid panel reflex to direct LDL Fasting; Future  - Lipid panel reflex to direct LDL Fasting    History of prediabetes  - Hemoglobin A1c; Future  - Hemoglobin A1c    Colon cancer screening  - Colonoscopy Screening  Referral; Future    Need for vaccination  - Pneumococcal 20 Valent Conjugate (Prevnar 20)            Counseling  Appropriate preventive services were discussed with this patient, including applicable screening as appropriate for fall prevention, nutrition, physical activity, Tobacco-use cessation, weight loss and cognition.  Checklist reviewing preventive services available has been given to the patient.  Reviewed patient's diet, addressing concerns and/or questions.   She is at risk for lack of exercise and has been provided with information to increase physical activity for the benefit of her well-being.   She is at risk for psychosocial distress and has been provided with  information to reduce risk.           Subjective   Lorie is a 43 year old, presenting for the following:  Physical        6/12/2024     9:54 AM   Additional Questions   Roomed by Keiko Watkins        Health Care Directive  Patient does not have a Health Care Directive or Living Will: Discussed advance care planning with patient; however, patient declined at this time.    HPI    No menstrual cycles for 5 weeks. She stopped birth control around 6 weeks ago. Her  had vasectomy (end of last year). She is also experiencing hot flashes and intermittent constipation. Constipation is better. Hot flashes occur around three times/day. Hot flashes started around this past month. She was travelling overseas and returned 6/2/24. This past month she hasn't been sleeping well. She is slightly irritated. She is unsure if it is due to jet lag. No snoring or apnea. Appetite normal. Mood great.         6/11/2024   General Health   How would you rate your overall physical health? (!) FAIR   Feel stress (tense, anxious, or unable to sleep) Only a little   (!) STRESS CONCERN      6/11/2024   Nutrition   Three or more servings of calcium each day? (!) I DON'T KNOW   Diet: Regular (no restrictions)   How many servings of fruit and vegetables per day? (!) 2-3   How many sweetened beverages each day? 0-1         6/11/2024   Exercise   Days per week of moderate/strenous exercise 1 day   Average minutes spent exercising at this level 10 min   (!) EXERCISE CONCERN      6/11/2024   Social Factors   Frequency of gathering with friends or relatives More than three times a week   Worry food won't last until get money to buy more No   Food not last or not have enough money for food? No   Do you have housing?  Yes   Are you worried about losing your housing? No   Lack of transportation? No   Unable to get utilities (heat,electricity)? No         6/11/2024   Dental   Dentist two times every year? Yes         6/11/2024   TB Screening   Were you  born outside of the [place]? Yes         Today's PHQ-2 Score:       6/11/2024     9:14 AM   PHQ-2 ( 1999 Pfizer)   Q1: Little interest or pleasure in doing things 0   Q2: Feeling down, depressed or hopeless 0   PHQ-2 Score 0   Q1: Little interest or pleasure in doing things Not at all   Q2: Feeling down, depressed or hopeless Not at all   PHQ-2 Score 0           6/11/2024   Substance Use   Alcohol more than 3/day or more than 7/wk No   Do you use any other substances recreationally? No     Social History     Tobacco Use    Smoking status: Never    Smokeless tobacco: Never   Vaping Use    Vaping status: Never Used   Substance Use Topics    Alcohol use: No    Drug use: No           1/4/2024   LAST FHS-7 RESULTS   1st degree relative breast or ovarian cancer No   Any relative bilateral breast cancer No   Any male have breast cancer No   Any ONE woman have BOTH breast AND ovarian cancer No   Any woman with breast cancer before 50yrs No   2 or more relatives with breast AND/OR ovarian cancer No   2 or more relatives with breast AND/OR bowel cancer No             6/11/2024   STI Screening   New sexual partner(s) since last STI/HIV test? No     History of abnormal Pap smear: No - age 30- 64 PAP with HPV every 5 years recommended        Latest Ref Rng & Units 3/20/2019     9:10 AM 3/20/2019     9:00 AM   PAP / HPV   PAP (Historical)  NIL     HPV 16 DNA NEG^Negative  Negative    HPV 18 DNA NEG^Negative  Negative    Other HR HPV NEG^Negative  Negative      ASCVD Risk   The ASCVD Risk score (Lorne HUMPHRIES, et al., 2019) failed to calculate for the following reasons:    The systolic blood pressure is missing        6/11/2024   Contraception/Family Planning   Questions about contraception or family planning No        Reviewed and updated as needed this visit by Provider                             Objective    Exam  There were no vitals taken for this visit.   Estimated body mass index is 18.68 kg/m  as calculated from the  "following:    Height as of 5/10/23: 1.612 m (5' 3.47\").    Weight as of 5/10/23: 48.5 kg (107 lb).    Physical Exam  /84   Temp 97.1  F (36.2  C)   Resp 15   Ht 1.61 m (5' 3.39\")   Wt 48.5 kg (107 lb)   SpO2 99%   BMI 18.72 kg/m     There were no vitals taken for this visit.             Signed Electronically by: Brock Booker MD    "

## 2024-06-12 NOTE — PATIENT INSTRUCTIONS
"Preventive Care Advice   This is general advice we often give to help people stay healthy. Your care team may have specific advice just for you. Please talk to your care team about your own preventive care needs.  Lifestyle  Exercise at least 150 minutes each week (30 minutes a day, 5 days a week).  Do muscle strengthening activities 2 days a week. These help control your weight and prevent disease.  No smoking.  Wear sunscreen to prevent skin cancer.  Have your home tested for radon every 2 to 5 years. Radon is a colorless, odorless gas that can harm your lungs. To learn more, go to www.health.Replaced by [facility].mn. and search for \"Radon in Homes.\"  Keep guns unloaded and locked up in a safe place like a safe or gun vault, or, use a gun lock and hide the keys. Always lock away bullets separately. To learn more, visit [place].gov and search for \"safe gun storage.\"  Nutrition  Eat 5 or more servings of fruits and vegetables each day.  Try wheat bread, brown rice and whole grain pasta (instead of white bread, rice, and pasta).  Get enough calcium and vitamin D. Check the label on foods and aim for 100% of the RDA (recommended daily allowance).  Regular exams  Have a dental exam and cleaning every 6 months.  See your health care team every year to talk about:  Any changes in your health.  Any medicines your care team has prescribed.  Preventive care, family planning, and ways to prevent chronic diseases.  Shots (vaccines)   HPV shots (up to age 26), if you've never had them before.  Hepatitis B shots (up to age 59), if you've never had them before.  COVID-19 shot: Get this shot when it's due.  Flu shot: Get a flu shot every year.  Tetanus shot: Get a tetanus shot every 10 years.  Pneumococcal, hepatitis A, and RSV shots: Ask your care team if you need these based on your risk.  Shingles shot (for age 50 and up).  General health tests  Diabetes screening:  Starting at age 35, Get screened for diabetes at least every 3 years.  If " you are younger than age 35, ask your care team if you should be screened for diabetes.  Cholesterol test: At age 39, start having a cholesterol test every 5 years, or more often if advised.  Bone density scan (DEXA): At age 50, ask your care team if you should have this scan for osteoporosis (brittle bones).  Hepatitis C: Get tested at least once in your life.  Abdominal aortic aneurysm screening: Talk to your doctor about having this screening if you:  Have ever smoked; and  Are biologically male; and  Are between the ages of 65 and 75.  STIs (sexually transmitted infections)  Before age 24: Ask your care team if you should be screened for STIs.  After age 24: Get screened for STIs if you're at risk. You are at risk for STIs (including HIV) if:  You are sexually active with more than one person.  You don't use condoms every time.  You or a partner was diagnosed with a sexually transmitted infection.  If you are at risk for HIV, ask about PrEP medicine to prevent HIV.  Get tested for HIV at least once in your life, whether you are at risk for HIV or not.  Cancer screening tests  Cervical cancer screening: If you have a cervix, begin getting regular cervical cancer screening tests at age 21. Most people who have regular screenings with normal results can stop after age 65. Talk about this with your provider.  Breast cancer scan (mammogram): If you've ever had breasts, begin having regular mammograms starting at age 40. This is a scan to check for breast cancer.  Colon cancer screening: It is important to start screening for colon cancer at age 45.  Have a colonoscopy test every 10 years (or more often if you're at risk) Or, ask your provider about stool tests like a FIT test every year or Cologuard test every 3 years.  To learn more about your testing options, visit: www.[employer]acher[employer]/581390.pdf.  For help making a decision, visit: michelet/px55577.  Prostate cancer screening test: If you have a prostate and are age 55  to 69, ask your provider if you would benefit from a yearly prostate cancer screening test.  Lung cancer screening: If you are a current or former smoker age 50 to 80, ask your care team if ongoing lung cancer screenings are right for you.  For informational purposes only. Not to replace the advice of your health care provider. Copyright   2023 [employer]. All rights reserved. Clinically reviewed by the [facility] Transitions Program. [place].com 035524 - REV 04/24.

## 2024-06-13 LAB
ALBUMIN SERPL BCG-MCNC: 4.7 G/DL (ref 3.5–5.2)
ALP SERPL-CCNC: 86 U/L (ref 40–150)
ALT SERPL W P-5'-P-CCNC: 21 U/L (ref 0–50)
ANION GAP SERPL CALCULATED.3IONS-SCNC: 11 MMOL/L (ref 7–15)
AST SERPL W P-5'-P-CCNC: 21 U/L (ref 0–45)
BILIRUB SERPL-MCNC: 0.5 MG/DL
BUN SERPL-MCNC: 14.2 MG/DL (ref 6–20)
CALCIUM SERPL-MCNC: 9.3 MG/DL (ref 8.6–10)
CHLORIDE SERPL-SCNC: 104 MMOL/L (ref 98–107)
CHOLEST SERPL-MCNC: 174 MG/DL
CREAT SERPL-MCNC: 0.64 MG/DL (ref 0.51–0.95)
DEPRECATED HCO3 PLAS-SCNC: 27 MMOL/L (ref 22–29)
EGFRCR SERPLBLD CKD-EPI 2021: >90 ML/MIN/1.73M2
FASTING STATUS PATIENT QL REPORTED: YES
FASTING STATUS PATIENT QL REPORTED: YES
GLUCOSE SERPL-MCNC: 95 MG/DL (ref 70–99)
HDLC SERPL-MCNC: 68 MG/DL
HPV HR 12 DNA CVX QL NAA+PROBE: NEGATIVE
HPV16 DNA CVX QL NAA+PROBE: NEGATIVE
HPV18 DNA CVX QL NAA+PROBE: NEGATIVE
HUMAN PAPILLOMA VIRUS FINAL DIAGNOSIS: NORMAL
LDLC SERPL CALC-MCNC: 83 MG/DL
NONHDLC SERPL-MCNC: 106 MG/DL
POTASSIUM SERPL-SCNC: 3.8 MMOL/L (ref 3.4–5.3)
PROT SERPL-MCNC: 7.2 G/DL (ref 6.4–8.3)
SODIUM SERPL-SCNC: 142 MMOL/L (ref 135–145)
TRIGL SERPL-MCNC: 117 MG/DL
TSH SERPL DL<=0.005 MIU/L-ACNC: 1.9 UIU/ML (ref 0.3–4.2)
VIT D+METAB SERPL-MCNC: 23 NG/ML (ref 20–50)

## 2024-06-13 RX ORDER — ERGOCALCIFEROL 1.25 MG/1
50000 CAPSULE, LIQUID FILLED ORAL WEEKLY
Qty: 12 CAPSULE | Refills: 0 | Status: SHIPPED | OUTPATIENT
Start: 2024-06-13 | End: 2024-08-30

## 2024-06-18 LAB
BKR LAB AP GYN ADEQUACY: NORMAL
BKR LAB AP GYN INTERPRETATION: NORMAL
BKR LAB AP PREVIOUS ABNORMAL: NORMAL
PATH REPORT.COMMENTS IMP SPEC: NORMAL
PATH REPORT.COMMENTS IMP SPEC: NORMAL
PATH REPORT.RELEVANT HX SPEC: NORMAL

## 2024-08-13 ENCOUNTER — TELEPHONE (OUTPATIENT)
Dept: GASTROENTEROLOGY | Facility: CLINIC | Age: 44
End: 2024-08-13
Payer: COMMERCIAL

## 2024-08-13 NOTE — TELEPHONE ENCOUNTER
"Endoscopy Scheduling Screen    Have you had a positive Covid test in the last 14 days?  No    What is your communication preference for Instructions and/or Bowel Prep?   MyChart    What insurance is in the chart?  Other:  BCBS    Ordering/Referring Provider: MANUEL MONROY   (If ordering provider performs procedure, schedule with ordering provider unless otherwise instructed. )    BMI: Estimated body mass index is 18.72 kg/m  as calculated from the following:    Height as of 6/12/24: 1.61 m (5' 3.39\").    Weight as of 6/12/24: 48.5 kg (107 lb).     Sedation Ordered  moderate sedation.   If patient BMI > 50 do not schedule in [facility].    If patient BMI > 45 do not schedule at [facility].    Are you taking methadone or Suboxone?  No    Have you had difficulties, pain, or discomfort during past endoscopy procedures?  No    Are you taking any prescription medications for pain 3 or more times per week?   NO, No RN review required.    Do you have a history of malignant hyperthermia?  No    (Females) Are you currently pregnant?   No     Have you been diagnosed or told you have pulmonary hypertension?   No    Do you have an LVAD?  No    Have you been told you have moderate to severe sleep apnea?  No    Have you been told you have COPD, asthma, or any other lung disease?  No shortness of breath    Do you have any heart conditions?  No     Have you ever had or are you waiting for an organ transplant?  No. Continue scheduling, no site restrictions.    Have you had a stroke or transient ischemic attack (TIA aka \"mini stroke\" in the last 6 months?   No    Have you been diagnosed with or been told you have cirrhosis of the liver?   No    Are you currently on dialysis?   No    Do you need assistance transferring?   No    BMI: Estimated body mass index is 18.72 kg/m  as calculated from the following:    Height as of 6/12/24: 1.61 m (5' 3.39\").    Weight as of 6/12/24: 48.5 kg (107 lb).     Is patients BMI > 40 and scheduling location " [facility]?  No    Do you take an injectable medication for weight loss or diabetes (excluding insulin)?  No    Do you take the medication Naltrexone?  No    Do you take blood thinners?  No       Prep   Are you currently on dialysis or do you have chronic kidney disease?  No    Do you have a diagnosis of diabetes?  No    Do you have a diagnosis of cystic fibrosis (CF)?  No    On a regular basis do you go 3 -5 days between bowel movements?  No    BMI > 40?  No    Preferred Pharmacy:    [facility] - [place], [place] - [address] [place] [address]  Phone: [phone] Fax: [phone]    Final Scheduling Details     Procedure scheduled  Colonoscopy    Surgeon:  YONNY     Date of procedure:  12/10/24     Pre-OP / PAC:   No - Not required for this site.    Location  [facility] - [facility] - Per order.    Sedation   Moderate Sedation - Per order.      Patient Reminders:   You will receive a call from a Nurse to review instructions and health history.  This assessment must be completed prior to your procedure.  Failure to complete the Nurse assessment may result in the procedure being cancelled.      On the day of your procedure, please designate an adult(s) who can drive you home stay with you for the next 24 hours. The medicines used in the exam will make you sleepy. You will not be able to drive.      You cannot take public transportation, ride share services, or non-medical taxi service without a responsible caregiver.  Medical transport services are allowed with the requirement that a responsible caregiver will receive you at your destination.  We require that drivers and caregivers are confirmed prior to your procedure.

## 2024-09-25 ENCOUNTER — IMMUNIZATION (OUTPATIENT)
Dept: PEDIATRICS | Facility: CLINIC | Age: 44
End: 2024-09-25
Payer: COMMERCIAL

## 2024-09-25 PROCEDURE — 90480 ADMN SARSCOV2 VAC 1/ONLY CMP: CPT

## 2024-09-25 PROCEDURE — 90471 IMMUNIZATION ADMIN: CPT

## 2024-09-25 PROCEDURE — 91320 SARSCV2 VAC 30MCG TRS-SUC IM: CPT

## 2024-09-25 PROCEDURE — 90656 IIV3 VACC NO PRSV 0.5 ML IM: CPT

## 2024-11-29 ENCOUNTER — TELEPHONE (OUTPATIENT)
Dept: GASTROENTEROLOGY | Facility: CLINIC | Age: 44
End: 2024-11-29
Payer: COMMERCIAL

## 2024-11-29 NOTE — TELEPHONE ENCOUNTER
Pre visit planning completed.      Procedure details:    Patient scheduled for Colonoscopy on 12/10/24.     Arrival time: 0915. Procedure time 1015    Facility location: [facility]; [address] [place] [address], [address] Floor, [place] [address]. Check in location: 5th Floor.    Sedation type: Conscious sedation     Pre op exam needed? No.    Indication for procedure: Screening      Chart review:     Electronic implanted devices? No    Recent diagnosis of diverticulitis within the last 6 weeks? No      Medication review:    Diabetic? No    Anticoagulants? No    Weight loss medication/injectable? No GLP-1 medication per patient's medication list. Nursing to verify with pre-assessment call.    Other medication HOLDING recommendations:  N/A      Prep for procedure:     Bowel prep recommendation: Standard Miralax  Due to: standard bowel prep.    Prep instructions sent via GuideIT         Dary Rodriguez RN  Endoscopy Procedure Pre Assessment   [phone] option 2

## 2024-11-29 NOTE — TELEPHONE ENCOUNTER
Attempted to contact patient in order to complete pre assessment questions.     Patient scheduled for Colonoscopy on 12/10/24.     No answer, VM box is full. Unable to leave message to return call to [phone] option 2.    Callback required communication sent via PrePay.    Flores Kelly RN  Endoscopy Procedure Pre Assessment

## 2024-12-03 NOTE — TELEPHONE ENCOUNTER
Second call attempt to complete pre assessment.     No answer.  VM box is full, unable to leave message to return call to [phone] #2 by next business day prior to 4PM or procedure will be sent to cancel.     Callback required communication sent via inDegree.      Flores Kelly RN  Endoscopy Procedure Pre Assessment

## 2024-12-05 ENCOUNTER — TELEPHONE (OUTPATIENT)
Dept: GASTROENTEROLOGY | Facility: CLINIC | Age: 44
End: 2024-12-05
Payer: COMMERCIAL

## 2024-12-05 ENCOUNTER — PATIENT OUTREACH (OUTPATIENT)
Dept: CARE COORDINATION | Facility: CLINIC | Age: 44
End: 2024-12-05
Payer: COMMERCIAL

## 2024-12-05 NOTE — TELEPHONE ENCOUNTER
Caller: No call    Reason for Reschedule/Cancellation   (please be detailed, any staff messages or encounters to note?): Cancellation policy - pre-assessment call not completed.      Prior to reschedule please review:  Ordering Provider: Brock Booker Determined: CS  Does patient have any ASC Exclusions, please identify?: No      Notes on Cancelled Procedure:  Procedure: Lower Endoscopy [Colonoscopy]   Date: 12/10/2024  Location: [facility]; [address] [place] [address], 5[address] Floor, [place] [address]  Surgeon: Kinsey      Rescheduled: No, sent MyChart and CTL.        Did you cancel or rescheduled an EUS procedure? No.     CASE IN DEPOT

## 2024-12-05 NOTE — TELEPHONE ENCOUNTER
----- Message from Dary CLOUD sent at 12/5/2024  6:43 AM CST -----  Regarding: Cancel procedure  Hi There,    Pre assessment was not completed for upcoming Colonoscopy scheduled on 12/10.    Please cancel per policy.       Thank you,   Dary Rodriguez RN  Endoscopy Procedure Pre Assessment   [phone] option 4

## 2024-12-05 NOTE — TELEPHONE ENCOUNTER
[place]), RN did reach out to scheduling team to keep appointment. Per Flora AYALA Will keep appointment as scheduled on 12/10.    Pre assessment completed for upcoming procedure.   (Please see previous telephone encounter notes for complete details)    Patient returned call.       Procedure details:    Arrival time and facility location reviewed.    Pre op exam needed? No.    Designated  policy reviewed. Instructed to have someone stay 6  hours post procedure.       Medication review:    Medications reviewed. Please see supporting documentation below. Holding recommendations discussed (if applicable).       Prep for procedure:     Procedure prep instructions reviewed.        Any additional information needed:  N/A      Patient verbalized understanding and had no questions or concerns at this time.      Flores Kelly RN  Endoscopy Procedure Pre Assessment   [phone] option 2

## 2024-12-05 NOTE — TELEPHONE ENCOUNTER
No return call received.     Patient scheduled for Colonoscopy on 12/10/24.     Pre assessment was not completed for upcoming scheduled procedure.     Staff message sent to endoscopy scheduling to cancel procedure per policy.       Dary Rodriguez RN   Endoscopy Procedure Pre Assessment

## 2024-12-10 ENCOUNTER — HOSPITAL ENCOUNTER (OUTPATIENT)
Facility: AMBULATORY SURGERY CENTER | Age: 44
Discharge: HOME | End: 2024-12-10
Attending: INTERNAL MEDICINE
Payer: COMMERCIAL

## 2024-12-10 VITALS
DIASTOLIC BLOOD PRESSURE: 70 MMHG | HEIGHT: 64 IN | HEART RATE: 68 BPM | TEMPERATURE: 97.2 F | BODY MASS INDEX: 18.61 KG/M2 | WEIGHT: 109 LBS | RESPIRATION RATE: 20 BRPM | OXYGEN SATURATION: 100 % | SYSTOLIC BLOOD PRESSURE: 103 MMHG

## 2024-12-10 LAB
COLONOSCOPY: NORMAL
HCG UR QL: NEGATIVE
INTERNAL QC OK POCT: NORMAL
POCT KIT EXPIRATION DATE: NORMAL
POCT KIT LOT NUMBER: NORMAL

## 2024-12-10 PROCEDURE — 88305 TISSUE EXAM BY PATHOLOGIST: CPT | Mod: 26 | Performed by: STUDENT IN AN ORGANIZED HEALTH CARE EDUCATION/TRAINING PROGRAM

## 2024-12-10 PROCEDURE — 88305 TISSUE EXAM BY PATHOLOGIST: CPT | Mod: TC | Performed by: INTERNAL MEDICINE

## 2024-12-10 PROCEDURE — 81025 URINE PREGNANCY TEST: CPT | Performed by: PATHOLOGY

## 2024-12-10 RX ORDER — NALOXONE HYDROCHLORIDE 0.4 MG/ML
0.4 INJECTION, SOLUTION INTRAMUSCULAR; INTRAVENOUS; SUBCUTANEOUS
Status: DISCONTINUED | OUTPATIENT
Start: 2024-12-10 | End: 2024-12-11 | Stop reason: HOSPADM

## 2024-12-10 RX ORDER — ONDANSETRON 2 MG/ML
4 INJECTION INTRAMUSCULAR; INTRAVENOUS
Status: DISCONTINUED | OUTPATIENT
Start: 2024-12-10 | End: 2024-12-10 | Stop reason: HOSPADM

## 2024-12-10 RX ORDER — ONDANSETRON 4 MG/1
4 TABLET, ORALLY DISINTEGRATING ORAL EVERY 6 HOURS PRN
Status: DISCONTINUED | OUTPATIENT
Start: 2024-12-10 | End: 2024-12-11 | Stop reason: HOSPADM

## 2024-12-10 RX ORDER — NALOXONE HYDROCHLORIDE 0.4 MG/ML
0.2 INJECTION, SOLUTION INTRAMUSCULAR; INTRAVENOUS; SUBCUTANEOUS
Status: DISCONTINUED | OUTPATIENT
Start: 2024-12-10 | End: 2024-12-11 | Stop reason: HOSPADM

## 2024-12-10 RX ORDER — FENTANYL CITRATE 50 UG/ML
INJECTION, SOLUTION INTRAMUSCULAR; INTRAVENOUS PRN
Status: DISCONTINUED | OUTPATIENT
Start: 2024-12-10 | End: 2024-12-10 | Stop reason: HOSPADM

## 2024-12-10 RX ORDER — FLUMAZENIL 0.1 MG/ML
0.2 INJECTION, SOLUTION INTRAVENOUS
Status: ACTIVE | OUTPATIENT
Start: 2024-12-10 | End: 2024-12-10

## 2024-12-10 RX ORDER — PROCHLORPERAZINE MALEATE 10 MG
10 TABLET ORAL EVERY 6 HOURS PRN
Status: DISCONTINUED | OUTPATIENT
Start: 2024-12-10 | End: 2024-12-11 | Stop reason: HOSPADM

## 2024-12-10 RX ORDER — LIDOCAINE 40 MG/G
CREAM TOPICAL
Status: DISCONTINUED | OUTPATIENT
Start: 2024-12-10 | End: 2024-12-10 | Stop reason: HOSPADM

## 2024-12-10 RX ORDER — ONDANSETRON 2 MG/ML
4 INJECTION INTRAMUSCULAR; INTRAVENOUS EVERY 6 HOURS PRN
Status: DISCONTINUED | OUTPATIENT
Start: 2024-12-10 | End: 2024-12-11 | Stop reason: HOSPADM

## 2024-12-10 NOTE — H&P
"Lorie Rosales  [id number]  female  44 year old      Reason for procedure/surgery: screening    Patient Active Problem List   Diagnosis   (none) - all problems resolved or deleted       Past Surgical History:  History reviewed. No pertinent surgical history.    Past Medical History:   Past Medical History:   Diagnosis Date    Tension headache     Neuro consult    Vertigo        Social History:   Social History     Tobacco Use    Smoking status: Never    Smokeless tobacco: Never   Substance Use Topics    Alcohol use: No       Family History:   Family History   Problem Relation Age of Onset    Aneurysm Mother 49        brain       Allergies:   Allergies   Allergen Reactions    Cedar [[place]]        Active Medications:   Current Outpatient Medications   Medication Sig Dispense Refill    benzonatate (TESSALON) 200 MG capsule Take 1 capsule (200 mg) by mouth 3 times daily as needed 30 capsule 0    levonorgestrel-ethinyl estradiol (FALMINA) 0.1-20 MG-MCG tablet Take 1 tablet by mouth daily 84 tablet 0       Systemic Review:   ROS otherwise negative    Physical Examination:   Vital Signs: /75 (BP Location: Right arm)   Pulse 88   Temp 97.4  F (36.3  C) (Temporal)   Resp 18   Ht 1.613 m (5' 3.5\")   Wt 49.4 kg (109 lb)   SpO2 98%   BMI 19.01 kg/m    GENERAL: healthy, alert and no distress  HENT: oropharynx clear and oral mucous membranes moist, Mallampati II  NECK: Normal ROM  RESP: lungs clear to auscultation - no rales, rhonchi or wheezes  CV: regular rate and rhythm, normal S1 S2,   ABDOMEN: soft, nontender, and bowel sounds normal  MS: no gross musculoskeletal defects noted, no edema      Plan: Appropriate to proceed as scheduled.      Claudette Euceda MD  12/10/2024    PCP:  Brock Booker"

## 2024-12-11 LAB
PATH REPORT.COMMENTS IMP SPEC: NORMAL
PATH REPORT.COMMENTS IMP SPEC: NORMAL
PATH REPORT.FINAL DX SPEC: NORMAL
PATH REPORT.GROSS SPEC: NORMAL
PATH REPORT.MICROSCOPIC SPEC OTHER STN: NORMAL
PATH REPORT.RELEVANT HX SPEC: NORMAL
PHOTO IMAGE: NORMAL

## 2025-01-02 ENCOUNTER — PATIENT OUTREACH (OUTPATIENT)
Dept: CARE COORDINATION | Facility: CLINIC | Age: 45
End: 2025-01-02
Payer: COMMERCIAL

## 2025-03-15 ENCOUNTER — HEALTH MAINTENANCE LETTER (OUTPATIENT)
Age: 45
End: 2025-03-15

## 2025-05-13 ENCOUNTER — PATIENT OUTREACH (OUTPATIENT)
Dept: CARE COORDINATION | Facility: CLINIC | Age: 45
End: 2025-05-13
Payer: COMMERCIAL

## 2025-07-22 ENCOUNTER — OFFICE VISIT (OUTPATIENT)
Dept: FAMILY MEDICINE | Facility: CLINIC | Age: 45
End: 2025-07-22
Payer: COMMERCIAL

## 2025-07-22 VITALS
TEMPERATURE: 98.2 F | DIASTOLIC BLOOD PRESSURE: 77 MMHG | RESPIRATION RATE: 16 BRPM | OXYGEN SATURATION: 98 % | WEIGHT: 112.9 LBS | SYSTOLIC BLOOD PRESSURE: 121 MMHG | HEIGHT: 64 IN | BODY MASS INDEX: 19.27 KG/M2

## 2025-07-22 DIAGNOSIS — N91.2 AMENORRHEA: ICD-10-CM

## 2025-07-22 DIAGNOSIS — Z00.00 ROUTINE GENERAL MEDICAL EXAMINATION AT A HEALTH CARE FACILITY: Primary | ICD-10-CM

## 2025-07-22 DIAGNOSIS — R73.03 PREDIABETES: ICD-10-CM

## 2025-07-22 LAB
EST. AVERAGE GLUCOSE BLD GHB EST-MCNC: 111 MG/DL
FSH SERPL IRP2-ACNC: 88.8 MIU/ML
HBA1C MFR BLD: 5.5 % (ref 0–5.6)

## 2025-07-22 PROCEDURE — 90636 HEP A/HEP B VACC ADULT IM: CPT | Performed by: NURSE PRACTITIONER

## 2025-07-22 PROCEDURE — 83001 ASSAY OF GONADOTROPIN (FSH): CPT | Performed by: NURSE PRACTITIONER

## 2025-07-22 PROCEDURE — 83036 HEMOGLOBIN GLYCOSYLATED A1C: CPT | Performed by: NURSE PRACTITIONER

## 2025-07-22 PROCEDURE — G2211 COMPLEX E/M VISIT ADD ON: HCPCS | Performed by: NURSE PRACTITIONER

## 2025-07-22 PROCEDURE — 3074F SYST BP LT 130 MM HG: CPT | Performed by: NURSE PRACTITIONER

## 2025-07-22 PROCEDURE — 3044F HG A1C LEVEL LT 7.0%: CPT | Performed by: NURSE PRACTITIONER

## 2025-07-22 PROCEDURE — 3078F DIAST BP <80 MM HG: CPT | Performed by: NURSE PRACTITIONER

## 2025-07-22 PROCEDURE — 99213 OFFICE O/P EST LOW 20 MIN: CPT | Mod: 25 | Performed by: NURSE PRACTITIONER

## 2025-07-22 PROCEDURE — 36415 COLL VENOUS BLD VENIPUNCTURE: CPT | Performed by: NURSE PRACTITIONER

## 2025-07-22 PROCEDURE — 99396 PREV VISIT EST AGE 40-64: CPT | Mod: 25 | Performed by: NURSE PRACTITIONER

## 2025-07-22 PROCEDURE — 1126F AMNT PAIN NOTED NONE PRSNT: CPT | Performed by: NURSE PRACTITIONER

## 2025-07-22 PROCEDURE — 90471 IMMUNIZATION ADMIN: CPT | Performed by: NURSE PRACTITIONER

## 2025-07-22 SDOH — HEALTH STABILITY: PHYSICAL HEALTH: ON AVERAGE, HOW MANY DAYS PER WEEK DO YOU ENGAGE IN MODERATE TO STRENUOUS EXERCISE (LIKE A BRISK WALK)?: 4 DAYS

## 2025-07-22 ASSESSMENT — ASTHMA QUESTIONNAIRES
QUESTION_3 LAST FOUR WEEKS HOW OFTEN DID YOUR ASTHMA SYMPTOMS (WHEEZING, COUGHING, SHORTNESS OF BREATH, CHEST TIGHTNESS OR PAIN) WAKE YOU UP AT NIGHT OR EARLIER THAN USUAL IN THE MORNING: NOT AT ALL
QUESTION_2 LAST FOUR WEEKS HOW OFTEN HAVE YOU HAD SHORTNESS OF BREATH: ONCE OR TWICE A WEEK
ACT_TOTALSCORE: 22
QUESTION_4 LAST FOUR WEEKS HOW OFTEN HAVE YOU USED YOUR RESCUE INHALER OR NEBULIZER MEDICATION (SUCH AS ALBUTEROL): NOT AT ALL
QUESTION_1 LAST FOUR WEEKS HOW MUCH OF THE TIME DID YOUR ASTHMA KEEP YOU FROM GETTING AS MUCH DONE AT WORK, SCHOOL OR AT HOME: A LITTLE OF THE TIME
QUESTION_5 LAST FOUR WEEKS HOW WOULD YOU RATE YOUR ASTHMA CONTROL: WELL CONTROLLED

## 2025-07-22 ASSESSMENT — SOCIAL DETERMINANTS OF HEALTH (SDOH): HOW OFTEN DO YOU GET TOGETHER WITH FRIENDS OR RELATIVES?: MORE THAN THREE TIMES A WEEK

## 2025-07-22 ASSESSMENT — PAIN SCALES - GENERAL: PAINLEVEL_OUTOF10: NO PAIN (0)

## 2025-07-22 NOTE — PROGRESS NOTES
Preventive Care Visit  [facility]  Ruthie Barkley, KATE, Nurse Practitioner Primary Care  Jul 22, 2025      Assessment & Plan     Routine general medical examination at a health care facility  Reviewed medical/social/family history and health maintenance   Pap: Due 2029  Mammo: Continue yearly screening, scheduled  Colon: Normal colonoscopy 2024, repeat 2034  DEXA:Pending results of FSH and if she is early menopause, will begin at age 50-55  Immunizations:  Twinrix today  Discussed healthy lifestyle and aging recommendations including regular exercise, adequate and regular sleep, 5+ fruits and veggies daily.      Amenorrhea  Possible early menopause vs PCOS given history of prediabetes.  Has had mild perimenopausal symptoms, but cycles regulated on OCP.  Pending FSH result, we can consider restarting ROBERT.  - Follicle stimulating hormone; Future  - Follicle stimulating hormone    Prediabetes  Continue to monitor A1C.  - Hemoglobin A1c; Future  - Hemoglobin A1c      Patient has been advised of split billing requirements and indicates understanding: Yes    The longitudinal plan of care for the diagnosis(es)/condition(s) as documented were addressed during this visit. Due to the added complexity in care, I will continue to support Lorie in the subsequent management and with ongoing continuity of care.    Counseling  Appropriate preventive services were addressed with this patient via screening, questionnaire, or discussion as appropriate for fall prevention, nutrition, physical activity, Tobacco-use cessation, social engagement, weight loss and cognition.  Checklist reviewing preventive services available has been given to the patient.  Reviewed patient's diet, addressing concerns and/or questions.   Reviewed preventive health counseling, as reflected in patient instructions        Subjective   Lorie is a 44 year old, presenting for the following:  Physical, Menopausal Sx (No period, last  one was around Sept 2024), Covid Concern (Recently had covid, questions about test/No longer has symptoms and feeling better today - pt is masked), and Memory Concerns (Concerns about forgetting things/Family history (grandma) of alzheimer)        7/22/2025    10:27 AM   Additional Questions   Roomed by Aleida HUNTER=        Via the Health Maintenance questionnaire, the patient has reported the following services have been completed -Mammogram: mn? 2024-01-01, this information has been sent to the abstraction team.    HPI  COVID last week.  First time, symptoms are mild.  Hasn't had period for a while.  Stopped ROBERT- when resumed it got her period.    Has been since fall LMP  Some mild hot flashes.  Very infrequent.    Used to be very regular while on OCP.          Advance Care Planning    Discussed advance care planning with patient; however, patient declined at this time.        7/22/2025   General Health   How would you rate your overall physical health? (!) FAIR   Feel stress (tense, anxious, or unable to sleep) Only a little   (!) STRESS CONCERN      7/22/2025   Nutrition   Three or more servings of calcium each day? (!) I DON'T KNOW   Diet: Regular (no restrictions)   How many servings of fruit and vegetables per day? (!) 2-3   How many sweetened beverages each day? 0-1         7/22/2025   Exercise   Days per week of moderate/strenous exercise 4 days         7/22/2025   Social Factors   Frequency of gathering with friends or relatives More than three times a week   Worry food won't last until get money to buy more No   Food not last or not have enough money for food? No   Do you have housing? (Housing is defined as stable permanent housing and does not include staying outside in a car, in a tent, in an abandoned building, in an overnight shelter, or couch-surfing.) No   Are you worried about losing your housing? No   Lack of transportation? No   Unable to get utilities (heat,electricity)? No   Want help with  housing or utility concern? No   (!) HOUSING CONCERN PRESENT      7/22/2025   Dental   Dentist two times every year? Yes         Today's PHQ-2 Score:       7/22/2025    10:19 AM   PHQ-2 ( 1999 Pf[employer])   Q1: Little interest or pleasure in doing things 0   Q2: Feeling down, depressed or hopeless 0   PHQ-2 Score 0    Q1: Little interest or pleasure in doing things Not at all   Q2: Feeling down, depressed or hopeless Not at all   PHQ-2 Score 0       Patient-reported           7/22/2025   Substance Use   Alcohol more than 3/day or more than 7/wk Not Applicable   Do you use any other substances recreationally? No     Social History     Tobacco Use    Smoking status: Never    Smokeless tobacco: Never   Vaping Use    Vaping status: Never Used   Substance Use Topics    Alcohol use: No    Drug use: No           1/4/2024   LAST FHS-7 RESULTS   1st degree relative breast or ovarian cancer No   Any relative bilateral breast cancer No   Any male have breast cancer No   Any ONE woman have BOTH breast AND ovarian cancer No   Any woman with breast cancer before 50yrs No   2 or more relatives with breast AND/OR ovarian cancer No   2 or more relatives with breast AND/OR bowel cancer No        Mammogram Screening - Mammogram every 1-2 years updated in Health Maintenance based on mutual decision making        7/22/2025   STI Screening   New sexual partner(s) since last STI/HIV test? No     History of abnormal Pap smear: No - age 30- 64 PAP with HPV every 5 years recommended        Latest Ref Rng & Units 6/12/2024    10:16 AM 3/20/2019     9:10 AM 3/20/2019     9:00 AM   PAP / HPV   PAP  Negative for Intraepithelial Lesion or Malignancy (NILM)      PAP (Historical)   NIL     HPV 16 DNA Negative Negative   Negative    HPV 18 DNA Negative Negative   Negative    Other HR HPV Negative Negative   Negative      ASCVD Risk   The 10-year ASCVD risk score (Lorne HUMPHRIES, et al., 2019) is: 0.4%    Values used to calculate the score:      Age:  "44 years      Sex: Female      Is Non- : No      Diabetic: No      Tobacco smoker: No      Systolic Blood Pressure: 121 mmHg      Is BP treated: No      HDL Cholesterol: 68 mg/dL      Total Cholesterol: 174 mg/dL        7/22/2025   Contraception/Family Planning   Questions about contraception or family planning No   What are your periods like? Not currently having periods        Reviewed and updated as needed this visit by Provider                             Objective    Exam  /77 (BP Location: Right arm, Patient Position: Sitting, Cuff Size: Adult Small)   Temp 98.2  F (36.8  C) (Temporal)   Ht 1.626 m (5' 4\")   Wt 51.2 kg (112 lb 14.4 oz)   LMP 09/09/2024 (Approximate)   BMI 19.38 kg/m     Estimated body mass index is 19.38 kg/m  as calculated from the following:    Height as of this encounter: 1.626 m (5' 4\").    Weight as of this encounter: 51.2 kg (112 lb 14.4 oz).    Physical Exam  GENERAL: alert and no distress  EYES: Eyes grossly normal to inspection, PERRL and conjunctivae and sclerae normal  HENT: ear canals and TM's normal, nose and mouth without ulcers or lesions  NECK: no adenopathy, no asymmetry, masses, or scars  RESP: lungs clear to auscultation - no rales, rhonchi or wheezes  BREAST: normal without masses, tenderness or nipple discharge and no palpable axillary masses or adenopathy  CV: regular rate and rhythm, normal S1 S2, no S3 or S4, no murmur, click or rub, no peripheral edema  ABDOMEN: soft, nontender, no hepatosplenomegaly, no masses and bowel sounds normal  MS: no gross musculoskeletal defects noted, no edema  SKIN: no suspicious lesions or rashes  NEURO: Normal strength and tone, mentation intact and speech normal  PSYCH: mentation appears normal, affect normal/bright        Signed Electronically by: Ruthie Barkley DNP    "

## 2025-07-22 NOTE — PROGRESS NOTES
Prior to immunization administration, verified patients identity using patient s name and date of birth. Please see Immunization Activity for additional information.     Screening Questionnaire for Adult Immunization    Are you sick today?   No   Do you have allergies to medications, food, a vaccine component or latex?   No   Have you ever had a serious reaction after receiving a vaccination?   No   Do you have a long-term health problem with heart, lung, kidney, or metabolic disease (e.g., diabetes), asthma, a blood disorder, no spleen, complement component deficiency, a cochlear implant, or a spinal fluid leak?  Are you on long-term aspirin therapy?   No   Do you have cancer, leukemia, HIV/AIDS, or any other immune system problem?   No   Do you have a parent, brother, or sister with an immune system problem?   No   In the past 3 months, have you taken medications that affect  your immune system, such as prednisone, other steroids, or anticancer drugs; drugs for the treatment of rheumatoid arthritis, Crohn s disease, or psoriasis; or have you had radiation treatments?   No   Have you had a seizure, or a brain or other nervous system problem?   No   During the past year, have you received a transfusion of blood or blood    products, or been given immune (gamma) globulin or antiviral drug?   No   For women: Are you pregnant or is there a chance you could become       pregnant during the next month?   No   Have you received any vaccinations in the past 4 weeks?   No     Immunization questionnaire answers were all negative.      Patient instructed to remain in clinic for 15 minutes afterwards, and to report any adverse reactions.     Screening performed by Haleigh Han CMA on 7/22/2025 at 11:11 AM.

## 2025-07-22 NOTE — PATIENT INSTRUCTIONS
Patient Education   Preventive Care Advice   This is general advice given by our system to help you stay healthy. However, your care team may have specific advice just for you. Please talk to your care team about your preventive care needs.  Nutrition  Eat 5 or more servings of fruits and vegetables each day.  Try wheat bread, brown rice and whole grain pasta (instead of white bread, rice, and pasta).  Get enough calcium and vitamin D. Check the label on foods and aim for 100% of the RDA (recommended daily allowance).  Lifestyle  Exercise at least 150 minutes each week  (30 minutes a day, 5 days a week).  Do muscle strengthening activities 2 days a week. These help control your weight and prevent disease.  No smoking.  Wear sunscreen to prevent skin cancer.  Have a dental exam and cleaning every 6 months.  Yearly exams  See your health care team every year to talk about:  Any changes in your health.  Any medicines your care team has prescribed.  Preventive care, family planning, and ways to prevent chronic diseases.  Shots (vaccines)   HPV shots (up to age 26), if you've never had them before.  Hepatitis B shots (up to age 59), if you've never had them before.  COVID-19 shot: Get this shot when it's due.  Flu shot: Get a flu shot every year.  Tetanus shot: Get a tetanus shot every 10 years.  Pneumococcal, hepatitis A, and RSV shots: Ask your care team if you need these based on your risk.  Shingles shot (for age 50 and up)  General health tests  Diabetes screening:  Starting at age 35, Get screened for diabetes at least every 3 years.  If you are younger than age 35, ask your care team if you should be screened for diabetes.  Cholesterol test: At age 39, start having a cholesterol test every 5 years, or more often if advised.  Bone density scan (DEXA): At age 50, ask your care team if you should have this scan for osteoporosis (brittle bones).  Hepatitis C: Get tested at least once in your life.  STIs (sexually  transmitted infections)  Before age 24: Ask your care team if you should be screened for STIs.  After age 24: Get screened for STIs if you're at risk. You are at risk for STIs (including HIV) if:  You are sexually active with more than one person.  You don't use condoms every time.  You or a partner was diagnosed with a sexually transmitted infection.  If you are at risk for HIV, ask about PrEP medicine to prevent HIV.  Get tested for HIV at least once in your life, whether you are at risk for HIV or not.  Cancer screening tests  Cervical cancer screening: If you have a cervix, begin getting regular cervical cancer screening tests starting at age 21.  Breast cancer scan (mammogram): If you've ever had breasts, begin having regular mammograms starting at age 40. This is a scan to check for breast cancer.  Colon cancer screening: It is important to start screening for colon cancer at age 45.  Have a colonoscopy test every 10 years (or more often if you're at risk) Or, ask your provider about stool tests like a FIT test every year or Cologuard test every 3 years.  To learn more about your testing options, visit:   .  For help making a decision, visit:   https://bit.ly/zf91930.  Prostate cancer screening test: If you have a prostate, ask your care team if a prostate cancer screening test (PSA) at age 55 is right for you.  Lung cancer screening: If you are a current or former smoker ages 50 to 80, ask your care team if ongoing lung cancer screenings are right for you.  For informational purposes only. Not to replace the advice of your health care provider. Copyright   2023 [employer]. All rights reserved. Clinically reviewed by the [facility]. [employer] 422808 - REV 01/24.

## 2025-07-28 ENCOUNTER — RESULTS FOLLOW-UP (OUTPATIENT)
Dept: FAMILY MEDICINE | Facility: CLINIC | Age: 45
End: 2025-07-28
Payer: COMMERCIAL

## 2025-07-28 DIAGNOSIS — E28.319 EARLY MENOPAUSE: Primary | ICD-10-CM

## 2025-07-28 NOTE — TELEPHONE ENCOUNTER
Review of chart shows next due for hep b 8/19 and beyond  Please review/advise on rest of message-thanks!

## 2025-07-28 NOTE — TELEPHONE ENCOUNTER
Let's offer the patient a virtual visit to follow up these results and discuss if HRT would be indicated. Would be ok to use POA if needed    I added DEXA scan to her HM to start at age 50, so no action needed on the Patient's part.    Hepatitis is also on HM and she can complete with either MA only apt or through the pharmacy.

## 2025-07-29 ENCOUNTER — ANCILLARY PROCEDURE (OUTPATIENT)
Dept: MAMMOGRAPHY | Facility: CLINIC | Age: 45
End: 2025-07-29
Attending: NURSE PRACTITIONER
Payer: COMMERCIAL

## 2025-07-29 DIAGNOSIS — Z12.31 VISIT FOR SCREENING MAMMOGRAM: ICD-10-CM

## 2025-07-29 PROCEDURE — 77063 BREAST TOMOSYNTHESIS BI: CPT | Performed by: RADIOLOGY

## 2025-07-29 PROCEDURE — 77067 SCR MAMMO BI INCL CAD: CPT | Performed by: RADIOLOGY

## (undated) DEVICE — ESU GROUND PAD ADULT W/CORD E7507

## (undated) DEVICE — SPECIMEN CONTAINER 3OZ W/FORMALIN 59901

## (undated) DEVICE — TUBING SUCTION MEDI-VAC 1/4"X20' N620A

## (undated) DEVICE — ENDO SNARE POLYPECTOMY OVAL 15MM LOOP SD-240U-15

## (undated) DEVICE — CLIP HEMOSTATIC ASSURANCE W11 MM 00711882

## (undated) DEVICE — SUCTION MANIFOLD NEPTUNE 2 SYS 1 PORT 702-025-000

## (undated) DEVICE — KIT ENDO TURNOVER/PROCEDURE CARRY-ON 101822

## (undated) DEVICE — ENDO SNARE EXACTO COLD 9MM LOOP 2.4MMX230CM 00711115

## (undated) DEVICE — ENDO TRAP POLYP E-TRAP 00711099

## (undated) DEVICE — SOL WATER IRRIG 500ML BOTTLE 2F7113

## (undated) DEVICE — FCP BIOPSY RADIAL JAW 4 JUMBO 3.2MM CHANNEL M00513370

## (undated) DEVICE — GOWN IMPERVIOUS 2XL BLUE

## (undated) RX ORDER — FENTANYL CITRATE 50 UG/ML
INJECTION, SOLUTION INTRAMUSCULAR; INTRAVENOUS
Status: DISPENSED
Start: 2024-12-10